# Patient Record
Sex: FEMALE | Race: WHITE | NOT HISPANIC OR LATINO | Employment: OTHER | URBAN - METROPOLITAN AREA
[De-identification: names, ages, dates, MRNs, and addresses within clinical notes are randomized per-mention and may not be internally consistent; named-entity substitution may affect disease eponyms.]

---

## 2017-06-23 ENCOUNTER — PREP FOR SURGERY (OUTPATIENT)
Dept: GYNECOLOGIC ONCOLOGY | Facility: CLINIC | Age: 82
End: 2017-06-23

## 2017-06-23 ENCOUNTER — OFFICE VISIT (OUTPATIENT)
Dept: GYNECOLOGIC ONCOLOGY | Facility: CLINIC | Age: 82
End: 2017-06-23

## 2017-06-23 VITALS
RESPIRATION RATE: 16 BRPM | HEIGHT: 63 IN | SYSTOLIC BLOOD PRESSURE: 167 MMHG | BODY MASS INDEX: 38.62 KG/M2 | TEMPERATURE: 97 F | HEART RATE: 63 BPM | OXYGEN SATURATION: 96 % | DIASTOLIC BLOOD PRESSURE: 77 MMHG | WEIGHT: 218 LBS

## 2017-06-23 DIAGNOSIS — R93.89 THICKENED ENDOMETRIUM: ICD-10-CM

## 2017-06-23 DIAGNOSIS — Z98.890: ICD-10-CM

## 2017-06-23 DIAGNOSIS — N95.0 POSTMENOPAUSAL BLEEDING: ICD-10-CM

## 2017-06-23 DIAGNOSIS — C54.1 ENDOMETRIAL CANCER (HCC): Primary | ICD-10-CM

## 2017-06-23 PROCEDURE — 99205 OFFICE O/P NEW HI 60 MIN: CPT | Performed by: OBSTETRICS & GYNECOLOGY

## 2017-06-23 RX ORDER — ATENOLOL 25 MG/1
50 TABLET ORAL DAILY
COMMUNITY
End: 2017-11-29 | Stop reason: ALTCHOICE

## 2017-06-23 RX ORDER — GABAPENTIN 100 MG/1
600 CAPSULE ORAL ONCE
Status: CANCELLED | OUTPATIENT
Start: 2017-06-23 | End: 2017-06-23

## 2017-06-23 RX ORDER — DICYCLOMINE HYDROCHLORIDE 10 MG/1
10 CAPSULE ORAL 3 TIMES DAILY PRN
COMMUNITY

## 2017-06-23 RX ORDER — LEVOTHYROXINE SODIUM 0.1 MG/1
100 TABLET ORAL DAILY
COMMUNITY

## 2017-06-23 RX ORDER — HYDROCHLOROTHIAZIDE 12.5 MG/1
12.5 TABLET ORAL DAILY
COMMUNITY

## 2017-06-23 RX ORDER — ACETAMINOPHEN 325 MG/1
975 TABLET ORAL ONCE
Status: CANCELLED | OUTPATIENT
Start: 2017-06-23 | End: 2017-06-23

## 2017-06-23 RX ORDER — RANITIDINE 150 MG/1
150 TABLET ORAL 2 TIMES DAILY
COMMUNITY

## 2017-06-23 RX ORDER — SODIUM CHLORIDE 0.9 % (FLUSH) 0.9 %
1-10 SYRINGE (ML) INJECTION AS NEEDED
Status: CANCELLED | OUTPATIENT
Start: 2017-06-23

## 2017-06-23 RX ORDER — PRAVASTATIN SODIUM 40 MG
40 TABLET ORAL DAILY
COMMUNITY

## 2017-06-23 RX ORDER — ALPRAZOLAM 0.5 MG/1
0.5 TABLET ORAL 4 TIMES DAILY PRN
COMMUNITY

## 2017-06-23 RX ORDER — BENAZEPRIL HYDROCHLORIDE 20 MG/1
20 TABLET ORAL DAILY
COMMUNITY

## 2017-06-23 RX ORDER — CITALOPRAM 20 MG/1
20 TABLET ORAL DAILY
COMMUNITY

## 2017-06-23 NOTE — PATIENT INSTRUCTIONS
Gynecologic Oncology  Inpatient Pre-op Patient Education  *See checked boxes for your instructions*    Patient Name:  Varsha Linares  [id number]  1935    Surgeon:  Dr. Ngo    Appointment  [x]  1. Your surgery has been scheduled on 6-30-17. You will need to be at the second floor surgery registration of the main hospital on that day at 6:00 AM.   [x] 2.  You have a pre-admission testing (PAT) appointment for labs and possibly chest xray and EKG, on 6-29-17 at 12:30 PM.  You will need to be at hospital registration on the first floor, 10 minutes before that time.   [x] 3.  The hospital registration department is located in the long hallway between the [address] buildings.     The Day(s) Before Surgery  [x] 1. On 6-29-17, the day prior to surgery, eat lightly.  No solid food after midnight on 6-29-17, including NO MILK, CREAM, OR ORANGE JUICE.  You may have sips of clear fluids up until two-three hours prior to your arrival to the hospital on the morning of surgery.     [] 2.  You may need to use a stool softener, the day prior to surgery to help with existing constipation and to clean out your bowels.  You can purchase Miralax over-the-counter at the pharmacy and follow the directions on the back.  (Do not do this step unless the box is checked).   [x] 3.  Do not take vitamins or full dose aspirin one week before surgery.  If you normally take a blood thinning medication such as Warfarin, Eliquis, or Xarelto, we will give you specific instructions regarding these medications and we may need to talk with your other doctors.   [x] 4.  On the morning of your surgery, you may likely take your routine prescription medications with a sip of water as reviewed with you by your surgeon.  Bring your home medications with you to the hospital as we may need to reference these.  In particular be sure to bring any inhalers.     Post-surgery Instructions  [x] 1.  The length of stay for your type of surgery is  typically one hospital night, however it is also possible that you could be discharged home in the evening on the same day depending on the nature of your surgery.  All rooms are private, so family member may stay with you.     [x] 2.  Do not take your own home prescription medication while you are in the hospital unless otherwise instructed.  These will be provided to you.         Comments:

## 2017-06-23 NOTE — PROGRESS NOTES
Varsha Linares  [id number]  1935    Subjective     Reason for visit:  Patient is being seen at the request of Dr. Manning for endometrial cancer    History of present illness:  The patient is a 81 y.o. female with a new diagnosis of endometrial cancer.  She presented to Dr. Manning with postmenopausal bleeding.  Endometrial biopsy showed endometrial adenocarcinoma with papillary features and foci of high-grade histology.  Comments state overall grade 2 with foci of grade 3.  Pap smear showed neoplastic cells, endocervical component.  She was referred to GYN Oncology    Today the patient endorses the above noted history.  She states that she experienced an episode of very heavy vaginal bleeding just after Memorial Day and this required evaluation at [facility] emergency department.  It sounds like there she had labs and an ultrasound.  She was then referred to Dr. Manning and had the above reported biopsy.  She has been without bleeding since.  When she did have bleeding she had abdominal cramping and pain in the left lower quadrant.  The symptoms are mild today.    Otherwise denies issues with bowel or bladder function.  Denies vomiting.  She's had mild nausea.  Also endorses fatigue.  Denies fevers or chills.  Denies chest pain or shortness of breath.  Normal appetite.    Treatment History:    1.  EMB with Dr. Manning showed showed endometrial adenocarcinoma with papillary features and foci of high-grade histology.  Comments stated overall grade 2 with foci of grade 3.      OBGYN History: NSVDx2.  No history of abnormal paps.  Prior RSO via Pfannenstiel for ovarian cysts.    Past Medical History:   Diagnosis Date   • Anxiety    • BMI 37.0-37.9, adult    • Dyslipidemia    • Endometrial cancer    • GERD (gastroesophageal reflux disease)    • HTN (hypertension)    • Hypothyroidism    • Spinal stenosis        Past Surgical History:   Procedure Laterality Date   • APPENDECTOMY OPEN     • BACK SURGERY     •  BREAST BIOPSY Right     x2, benign disease   • CATARACT EXTRACTION     • CHOLECYSTECTOMY OPEN  1987   • RIGHT OOPHORECTOMY  1969    Pfannenstiel.  Ovarian cysts.   • TOTAL KNEE ARTHROPLASTY Left 2007       MEDICATIONS: The current medication list was reviewed with the patient and updated in the EMR this date per the Medical Assistant. Medication dosages and frequencies were confirmed to be accurate.      Allergies:  has No Known Allergies.    Social History: She is .  She lives 45 min away in [place].  She lives by herself and does not work.  She denies use of tobacco, alcohol, and illicit drugs.     Family History:  Denies a history of breast, colon, endometrial, ovarian, and prostate cancer.  No history of melanoma.    Health Maintenance:    1. Mammogram - 12/2015  2. Colonoscopy - 2002    Review of Systems:  CONSTITUTIONAL:  Weight has been stable.  No excessive fatigue.  No fever.  No chills, night sweats.  PSYCHIATRIC: + anxiety.  No history of depression. No bipolar disorder or insomnia.  EYES: Vision is unchanged.  No photophobia.  EARS, NOSE, MOUTH, AND THROAT: Hearing normal. No swallowing difficulties.  No sore throat.  ENDOCRINE: No history of diabetes.  + thyroid disease.  LYMPHATIC: No enlarged lymph nodes  RESPIRATORY: No shortness of breath, cough, asthma or wheezing.  No hemoptysis.  CARDIOVASCULAR: No angina, orthopnea.  No edema.  +HTN.  +hyperlipidemia.  GASTROINTESTINAL: No constipation.  No diarrhea.  No melena.  +reflux.  No nausea.  No vomiting.  GENITOURINARY: No dysuria, hematuria, urgency, or frequency.  NEUROLOGIC: No numbness, weakness, motor disturbance, syncope, seizure, or headaches.  MUSCULOSKELETAL: + joint pain.  No muscle weakness.  INTEGUMENTARY: No new skin lesions.  GYNECOLOGIC: Per history of present illness.  HEMATOLOGIC: No bleeding problems.  Denies easy bruising.    Objective      Physical Exam  Vitals:    06/23/17 1004   BP: 167/77   Pulse: 63   Resp: 16   Temp:  97 °F (36.1 °C)   SpO2: 96%     Body mass index is 38.62 kg/(m^2).    Wt Readings from Last 3 Encounters:   06/23/17 218 lb (98.9 kg)     ECOG PS 0    GENERAL: Alert, well-appearing female in no apparent distress.  She appears her stated age.  She is here with her son.  HEENT: Sclera anicteric, normal eyelids. Head normocephalic, atraumatic. Mucus membranes moist.  Normal dentition.    NECK: Trachea midline, supple, without masses.  No thyromegaly.      BREASTS: Deferred  CARDIOVASCULAR: Normal rate, regular rhythm, no murmurs, rubs, or gallops.  Extremities symmetric.  No peripheral edema.  RESPIRATORY: Clear to auscultation bilaterally, normal respiratory effort  GASTROINTESTINAL:  Soft, obese, non-tender, non-distended, no rebound or guarding, no masses, or hernias.  No HSM.  Incisions - well-healed Pfannenstiel, open appendectomy and open aida incisions  MUSCULOSKELETAL:  Normal gait and station.  FROMx4.  No digital cyanosis.    SKIN:  Warm, dry, well-perfused.  All visible areas intact.  No rashes, lesions, ulcers.  PSYCHIATRIC: AO x3, with appropriate affect, normal thought processes  LYMPHATICS:  No cervical or inguinal adenopathy noted.  PELVIC exam:  Normal external female genitalia without lesions or skin changes.  Urethra midline.  Vagina without lesions.  Cervix 2 cm and without visible lesions.  On bimanual exam vagina and cervix are smooth and without nodularity.  No CMT.  Uterus is indistinct secondary to habitus. No palpable abdominal masses. Rectovaginal exam confirmatory. The pelvic sidewalls are smooth, the cul de sac is clear, the rectovaginal septum is supple.      Diagnostic Data:    Pathology report per above      Assessment/Plan  This is a 81 y.o. woman clinical stage IA endometrial adenocarcinoma with papillary features and foci of high-grade histology (overall grade 2 with foci of grade 3)    1.  Endometrial cancer  · I reviewed her pathology report and the natural history of her type of  endometrial cancer and [place] pictures to supplement the discussion.  We discussed the different types of histology and the difference between stage and grade.  · I discussed the general approach to the treatment of endometrial cancer as surgical staging   · With high-grade disease in the reported extent of bleeding she had at presentation, I recommend a CT scan of the abdomen and pelvis for treatment planning and ordered this today.  · I also requested her outside hospital emergency Department records  · Otherwise, I will plan on proceeding with a robotic approach  · I discussed various strategies for lymph node dissection (full staging versus staging based on frozen section) and recommend frozen section at the time of surgery to guide decision-making.   · I reviewed the surgical risks as including but not limited to as bleeding, infection, damage to surrounding structures, wound complications, VTE, lymphedema, and sensory nerve disruption.  As well as possible need to convert to laparotomy.  She understands her obesity and extensive prior surgical history place her at additional risk. .  · All questions were answered.      2.  Surgery:    · I have scheduled her surgery for 6/30/17  · Plan is for a RATLH/BSO/possible staging  · An appointment has been made for her to go to preoperative testing.     · In terms of additional work-up needed prior to surgery, I have ordered her pre-operative labs including a (CBC, CMP, , T+S).  I have also ordered an EKG, and CXR.  · Per usual recommendations, she will receive double DVT prophylaxis with a pharmacologic agent/SCD's.  I have ordered Enoxaparin to be given in the pre-op area.  · I also recommend she receive a TAP block the morning of her surgery and have ordered Gabapentin, and Acetaminophen to be given in the pre-op area in accordance with the Enhanced Recovery After Surgery protocols.   · I ordered perioperative antibiotics as Ancef  · Bowel preparation is not  indicated.          Electronically Signed by: Pretty Ngo MD  Date: 6/23/2017      Time:  12:33 PM    Note: Speech recognition transcription software was used to dictate portions of this document.  An attempt at proofreading has been made though minor errors in transcription may still be present.  Please do not hesitate to call our office with any questions.      Addendum  OSH records received.  U/s on 5/30/17 showed a 7 x 5 x 7 cm uterus with a thickened and heterogeneous stripe measuring 2 cm.  Additionally a cystic 1 cm area was seen in the endometrium near the fundus.  No free fluid present.    11.8>13.9/41.7<192.  Creatinine 1.1    Pretty Ngo MD  06/23/17  12:35 PM

## 2017-06-23 NOTE — H&P
Varsha Linares  [id number]  1935    Subjective     Reason for visit:  Patient is being seen at the request of Dr. Manning for endometrial cancer    History of present illness:  The patient is a 81 y.o. female with a new diagnosis of endometrial cancer.  She presented to Dr. Manning with postmenopausal bleeding.  Endometrial biopsy showed endometrial adenocarcinoma with papillary features and foci of high-grade histology.  Comments state overall grade 2 with foci of grade 3.  Pap smear showed neoplastic cells, endocervical component.  She was referred to GYN Oncology    Today the patient endorses the above noted history.  She states that she experienced an episode of very heavy vaginal bleeding just after Memorial Day and this required evaluation at [facility] emergency department.  It sounds like there she had labs and an ultrasound.  She was then referred to Dr. Manning and had the above reported biopsy.  She has been without bleeding since.  When she did have bleeding she had abdominal cramping and pain in the left lower quadrant.  The symptoms are mild today.    Otherwise denies issues with bowel or bladder function.  Denies vomiting.  She's had mild nausea.  Also endorses fatigue.  Denies fevers or chills.  Denies chest pain or shortness of breath.  Normal appetite.    Treatment History:    1.  EMB with Dr. Manning showed showed endometrial adenocarcinoma with papillary features and foci of high-grade histology.  Comments stated overall grade 2 with foci of grade 3.      OBGYN History: NSVDx2.  No history of abnormal paps.  Prior RSO via Pfannenstiel for ovarian cysts.    Past Medical History:   Diagnosis Date   • Anxiety    • BMI 37.0-37.9, adult    • Dyslipidemia    • Endometrial cancer    • GERD (gastroesophageal reflux disease)    • HTN (hypertension)    • Hypothyroidism    • Spinal stenosis        Past Surgical History:   Procedure Laterality Date   • APPENDECTOMY OPEN     • BACK SURGERY     •  BREAST BIOPSY Right     x2, benign disease   • CATARACT EXTRACTION     • CHOLECYSTECTOMY OPEN  1987   • RIGHT OOPHORECTOMY  1969    Pfannenstiel.  Ovarian cysts.   • TOTAL KNEE ARTHROPLASTY Left 2007       MEDICATIONS: The current medication list was reviewed with the patient and updated in the EMR this date per the Medical Assistant. Medication dosages and frequencies were confirmed to be accurate.      Allergies:  has No Known Allergies.    Social History: She is .  She lives 45 min away in [place].  She lives by herself and does not work.  She denies use of tobacco, alcohol, and illicit drugs.     Family History:  Denies a history of breast, colon, endometrial, ovarian, and prostate cancer.  No history of melanoma.    Health Maintenance:    1. Mammogram - 12/2015  2. Colonoscopy - 2002    Review of Systems:  CONSTITUTIONAL:  Weight has been stable.  No excessive fatigue.  No fever.  No chills, night sweats.  PSYCHIATRIC: + anxiety.  No history of depression. No bipolar disorder or insomnia.  EYES: Vision is unchanged.  No photophobia.  EARS, NOSE, MOUTH, AND THROAT: Hearing normal. No swallowing difficulties.  No sore throat.  ENDOCRINE: No history of diabetes.  + thyroid disease.  LYMPHATIC: No enlarged lymph nodes  RESPIRATORY: No shortness of breath, cough, asthma or wheezing.  No hemoptysis.  CARDIOVASCULAR: No angina, orthopnea.  No edema.  +HTN.  +hyperlipidemia.  GASTROINTESTINAL: No constipation.  No diarrhea.  No melena.  +reflux.  No nausea.  No vomiting.  GENITOURINARY: No dysuria, hematuria, urgency, or frequency.  NEUROLOGIC: No numbness, weakness, motor disturbance, syncope, seizure, or headaches.  MUSCULOSKELETAL: + joint pain.  No muscle weakness.  INTEGUMENTARY: No new skin lesions.  GYNECOLOGIC: Per history of present illness.  HEMATOLOGIC: No bleeding problems.  Denies easy bruising.    Objective      Physical Exam  Vitals:    06/23/17 1004   BP: 167/77   Pulse: 63   Resp: 16   Temp:  97 °F (36.1 °C)   SpO2: 96%     Body mass index is 38.62 kg/(m^2).    Wt Readings from Last 3 Encounters:   06/23/17 218 lb (98.9 kg)     ECOG PS 0    GENERAL: Alert, well-appearing female in no apparent distress.  She appears her stated age.  She is here with her son.  HEENT: Sclera anicteric, normal eyelids. Head normocephalic, atraumatic. Mucus membranes moist.  Normal dentition.    NECK: Trachea midline, supple, without masses.  No thyromegaly.      BREASTS: Deferred  CARDIOVASCULAR: Normal rate, regular rhythm, no murmurs, rubs, or gallops.  Extremities symmetric.  No peripheral edema.  RESPIRATORY: Clear to auscultation bilaterally, normal respiratory effort  GASTROINTESTINAL:  Soft, obese, non-tender, non-distended, no rebound or guarding, no masses, or hernias.  No HSM.  Incisions - well-healed Pfannenstiel, open appendectomy and open aida incisions  MUSCULOSKELETAL:  Normal gait and station.  FROMx4.  No digital cyanosis.    SKIN:  Warm, dry, well-perfused.  All visible areas intact.  No rashes, lesions, ulcers.  PSYCHIATRIC: AO x3, with appropriate affect, normal thought processes  LYMPHATICS:  No cervical or inguinal adenopathy noted.  PELVIC exam:  Normal external female genitalia without lesions or skin changes.  Urethra midline.  Vagina without lesions.  Cervix 2 cm and without visible lesions.  On bimanual exam vagina and cervix are smooth and without nodularity.  No CMT.  Uterus is indistinct secondary to habitus. No palpable abdominal masses. Rectovaginal exam confirmatory. The pelvic sidewalls are smooth, the cul de sac is clear, the rectovaginal septum is supple.      Diagnostic Data:    Pathology report per above      Assessment/Plan  This is a 81 y.o. woman clinical stage IA endometrial adenocarcinoma with papillary features and foci of high-grade histology (overall grade 2 with foci of grade 3)    1.  Endometrial cancer  · I reviewed her pathology report and the natural history of her type of  endometrial cancer and [place] pictures to supplement the discussion.  We discussed the different types of histology and the difference between stage and grade.  · I discussed the general approach to the treatment of endometrial cancer as surgical staging   · With high-grade disease in the reported extent of bleeding she had at presentation, I recommend a CT scan of the abdomen and pelvis for treatment planning and ordered this today.  · I also requested her outside hospital emergency Department records  · Otherwise, I will plan on proceeding with a robotic approach  · I discussed various strategies for lymph node dissection (full staging versus staging based on frozen section) and recommend frozen section at the time of surgery to guide decision-making.   · I reviewed the surgical risks as including but not limited to as bleeding, infection, damage to surrounding structures, wound complications, VTE, lymphedema, and sensory nerve disruption.  As well as possible need to convert to laparotomy.  She understands her obesity and extensive prior surgical history place her at additional risk. .  · All questions were answered.      2.  Surgery:    · I have scheduled her surgery for 6/30/17  · Plan is for a RATLH/BSO/possible staging  · An appointment has been made for her to go to preoperative testing.     · In terms of additional work-up needed prior to surgery, I have ordered her pre-operative labs including a (CBC, CMP, , T+S).  I have also ordered an EKG, and CXR.  · Per usual recommendations, she will receive double DVT prophylaxis with a pharmacologic agent/SCD's.  I have ordered Enoxaparin to be given in the pre-op area.  · I also recommend she receive a TAP block the morning of her surgery and have ordered Gabapentin, and Acetaminophen to be given in the pre-op area in accordance with the Enhanced Recovery After Surgery protocols.   · I ordered perioperative antibiotics as Ancef  · Bowel preparation is not  indicated.          Electronically Signed by: Pretty Ngo MD  Date: 6/23/2017      Time:  12:33 PM    Note: Speech recognition transcription software was used to dictate portions of this document.  An attempt at proofreading has been made though minor errors in transcription may still be present.  Please do not hesitate to call our office with any questions.      Addendum  OSH records received.  U/s on 5/30/17 showed a 7 x 5 x 7 cm uterus with a thickened and heterogeneous stripe measuring 2 cm.  Additionally a cystic 1 cm area was seen in the endometrium near the fundus.  No free fluid present.    11.8>13.9/41.7<192.  Creatinine 1.1    Pretty Ngo MD  06/23/17  12:35 PM

## 2017-06-28 ENCOUNTER — RESULTS ENCOUNTER (OUTPATIENT)
Dept: GYNECOLOGIC ONCOLOGY | Facility: CLINIC | Age: 82
End: 2017-06-28

## 2017-06-28 DIAGNOSIS — C54.1 ENDOMETRIAL CANCER (HCC): ICD-10-CM

## 2017-06-29 ENCOUNTER — APPOINTMENT (OUTPATIENT)
Dept: PREADMISSION TESTING | Facility: HOSPITAL | Age: 82
End: 2017-06-29

## 2017-06-29 ENCOUNTER — HOSPITAL ENCOUNTER (OUTPATIENT)
Dept: GENERAL RADIOLOGY | Facility: HOSPITAL | Age: 82
Discharge: HOME | End: 2017-06-29

## 2017-06-29 ENCOUNTER — HOSPITAL ENCOUNTER (OUTPATIENT)
Dept: CT IMAGING | Facility: HOSPITAL | Age: 82
Discharge: HOME | End: 2017-06-29
Attending: OBSTETRICS & GYNECOLOGY | Admitting: OBSTETRICS & GYNECOLOGY

## 2017-06-29 VITALS — WEIGHT: 218.92 LBS | BODY MASS INDEX: 37.37 KG/M2 | HEIGHT: 64 IN

## 2017-06-29 DIAGNOSIS — C54.1 ENDOMETRIAL CANCER (HCC): ICD-10-CM

## 2017-06-29 LAB
ABO GROUP BLD: NORMAL
ALBUMIN SERPL-MCNC: 4.2 G/DL (ref 3.2–4.8)
ALBUMIN/GLOB SERPL: 2 G/DL (ref 1.5–2.5)
ALP SERPL-CCNC: 68 U/L (ref 25–100)
ALT SERPL W P-5'-P-CCNC: 20 U/L (ref 7–40)
ANION GAP SERPL CALCULATED.3IONS-SCNC: 7 MMOL/L (ref 3–11)
AST SERPL-CCNC: 24 U/L (ref 0–33)
BASOPHILS # BLD AUTO: 0.04 10*3/MM3 (ref 0–0.2)
BASOPHILS NFR BLD AUTO: 0.4 % (ref 0–1)
BILIRUB SERPL-MCNC: 0.6 MG/DL (ref 0.3–1.2)
BLD GP AB SCN SERPL QL: NEGATIVE
BUN BLD-MCNC: 16 MG/DL (ref 9–23)
BUN/CREAT SERPL: 13.3 (ref 7–25)
CALCIUM SPEC-SCNC: 9.8 MG/DL (ref 8.7–10.4)
CANCER AG125 SERPL QL: 29 U/ML (ref 0–30.2)
CHLORIDE SERPL-SCNC: 105 MMOL/L (ref 99–109)
CO2 SERPL-SCNC: 27 MMOL/L (ref 20–31)
CREAT BLD-MCNC: 1.2 MG/DL (ref 0.6–1.3)
DEPRECATED RDW RBC AUTO: 45.3 FL (ref 37–54)
EOSINOPHIL # BLD AUTO: 0.07 10*3/MM3 (ref 0–0.3)
EOSINOPHIL NFR BLD AUTO: 0.7 % (ref 0–3)
ERYTHROCYTE [DISTWIDTH] IN BLOOD BY AUTOMATED COUNT: 13 % (ref 11.3–14.5)
GFR SERPL CREATININE-BSD FRML MDRD: 43 ML/MIN/1.73
GLOBULIN UR ELPH-MCNC: 2.1 GM/DL
GLUCOSE BLD-MCNC: 95 MG/DL (ref 70–100)
HCT VFR BLD AUTO: 42.9 % (ref 34.5–44)
HGB BLD-MCNC: 13.9 G/DL (ref 11.5–15.5)
IMM GRANULOCYTES # BLD: 0.03 10*3/MM3 (ref 0–0.03)
IMM GRANULOCYTES NFR BLD: 0.3 % (ref 0–0.6)
LYMPHOCYTES # BLD AUTO: 4.93 10*3/MM3 (ref 0.6–4.8)
LYMPHOCYTES NFR BLD AUTO: 51.4 % (ref 24–44)
MCH RBC QN AUTO: 30.9 PG (ref 27–31)
MCHC RBC AUTO-ENTMCNC: 32.4 G/DL (ref 32–36)
MCV RBC AUTO: 95.3 FL (ref 80–99)
MONOCYTES # BLD AUTO: 0.73 10*3/MM3 (ref 0–1)
MONOCYTES NFR BLD AUTO: 7.6 % (ref 0–12)
NEUTROPHILS # BLD AUTO: 3.79 10*3/MM3 (ref 1.5–8.3)
NEUTROPHILS NFR BLD AUTO: 39.6 % (ref 41–71)
PLATELET # BLD AUTO: 188 10*3/MM3 (ref 150–450)
PMV BLD AUTO: 9.3 FL (ref 6–12)
POTASSIUM BLD-SCNC: 4 MMOL/L (ref 3.5–5.5)
PROT SERPL-MCNC: 6.3 G/DL (ref 5.7–8.2)
RBC # BLD AUTO: 4.5 10*6/MM3 (ref 3.89–5.14)
RH BLD: POSITIVE
SODIUM BLD-SCNC: 139 MMOL/L (ref 132–146)
WBC NRBC COR # BLD: 9.59 10*3/MM3 (ref 3.5–10.8)

## 2017-06-29 RX ADMIN — IOPAMIDOL 90 ML: 612 INJECTION, SOLUTION INTRAVENOUS at 14:56

## 2017-06-30 ENCOUNTER — ANESTHESIA EVENT (OUTPATIENT)
Dept: PERIOP | Facility: HOSPITAL | Age: 82
End: 2017-06-30

## 2017-06-30 ENCOUNTER — ANESTHESIA (OUTPATIENT)
Dept: PERIOP | Facility: HOSPITAL | Age: 82
End: 2017-06-30

## 2017-06-30 ENCOUNTER — HOSPITAL ENCOUNTER (INPATIENT)
Facility: HOSPITAL | Age: 82
LOS: 1 days | Discharge: HOME | End: 2017-07-01
Attending: OBSTETRICS & GYNECOLOGY | Admitting: OBSTETRICS & GYNECOLOGY

## 2017-06-30 DIAGNOSIS — C54.1 ENDOMETRIAL CANCER (HCC): ICD-10-CM

## 2017-06-30 LAB
ABO GROUP BLD: NORMAL
RH BLD: POSITIVE

## 2017-06-30 PROCEDURE — 94799 UNLISTED PULMONARY SVC/PX: CPT

## 2017-06-30 PROCEDURE — 88331 PATH CONSLTJ SURG 1 BLK 1SPC: CPT | Performed by: OBSTETRICS & GYNECOLOGY

## 2017-06-30 PROCEDURE — 8E0W4CZ ROBOTIC ASSISTED PROCEDURE OF TRUNK REGION, PERCUTANEOUS ENDOSCOPIC APPROACH: ICD-10-PCS | Performed by: OBSTETRICS & GYNECOLOGY

## 2017-06-30 PROCEDURE — 0UT9FZZ RESECTION OF UTERUS, VIA NATURAL OR ARTIFICIAL OPENING WITH PERCUTANEOUS ENDOSCOPIC ASSISTANCE: ICD-10-PCS | Performed by: OBSTETRICS & GYNECOLOGY

## 2017-06-30 PROCEDURE — 25010000002 NEOSTIGMINE PER 0.5 MG: Performed by: NURSE ANESTHETIST, CERTIFIED REGISTERED

## 2017-06-30 PROCEDURE — 88305 TISSUE EXAM BY PATHOLOGIST: CPT | Performed by: OBSTETRICS & GYNECOLOGY

## 2017-06-30 PROCEDURE — 25010000002 FENTANYL CITRATE (PF) 100 MCG/2ML SOLUTION: Performed by: NURSE ANESTHETIST, CERTIFIED REGISTERED

## 2017-06-30 PROCEDURE — 25010000002 PROPOFOL 1000 MG/ML EMULSION: Performed by: NURSE ANESTHETIST, CERTIFIED REGISTERED

## 2017-06-30 PROCEDURE — 25010000002 HYDROMORPHONE PER 4 MG: Performed by: OBSTETRICS & GYNECOLOGY

## 2017-06-30 PROCEDURE — 58571 TLH W/T/O 250 G OR LESS: CPT | Performed by: OBSTETRICS & GYNECOLOGY

## 2017-06-30 PROCEDURE — 25010000002 BUPRENORPHINE PER 0.1 MG: Performed by: NURSE ANESTHETIST, CERTIFIED REGISTERED

## 2017-06-30 PROCEDURE — 25010000002 ONDANSETRON PER 1 MG: Performed by: NURSE ANESTHETIST, CERTIFIED REGISTERED

## 2017-06-30 PROCEDURE — 88309 TISSUE EXAM BY PATHOLOGIST: CPT | Performed by: OBSTETRICS & GYNECOLOGY

## 2017-06-30 PROCEDURE — 25010000002 ENOXAPARIN PER 10 MG: Performed by: OBSTETRICS & GYNECOLOGY

## 2017-06-30 PROCEDURE — 25010000002 PROPOFOL 10 MG/ML EMULSION: Performed by: NURSE ANESTHETIST, CERTIFIED REGISTERED

## 2017-06-30 PROCEDURE — 25010000003 CEFAZOLIN IN DEXTROSE 2-4 GM/100ML-% SOLUTION: Performed by: OBSTETRICS & GYNECOLOGY

## 2017-06-30 PROCEDURE — 86900 BLOOD TYPING SEROLOGIC ABO: CPT

## 2017-06-30 PROCEDURE — 0UT7FZZ RESECTION OF BILATERAL FALLOPIAN TUBES, VIA NATURAL OR ARTIFICIAL OPENING WITH PERCUTANEOUS ENDOSCOPIC ASSISTANCE: ICD-10-PCS | Performed by: OBSTETRICS & GYNECOLOGY

## 2017-06-30 PROCEDURE — 25010000002 DEXAMETHASONE PER 1 MG: Performed by: NURSE ANESTHETIST, CERTIFIED REGISTERED

## 2017-06-30 PROCEDURE — 88307 TISSUE EXAM BY PATHOLOGIST: CPT | Performed by: OBSTETRICS & GYNECOLOGY

## 2017-06-30 PROCEDURE — 86901 BLOOD TYPING SEROLOGIC RH(D): CPT

## 2017-06-30 PROCEDURE — 25010000002 ONDANSETRON PER 1 MG: Performed by: OBSTETRICS & GYNECOLOGY

## 2017-06-30 PROCEDURE — 0DNS4ZZ RELEASE GREATER OMENTUM, PERCUTANEOUS ENDOSCOPIC APPROACH: ICD-10-PCS | Performed by: OBSTETRICS & GYNECOLOGY

## 2017-06-30 PROCEDURE — 25010000002 PROMETHAZINE PER 50 MG: Performed by: OBSTETRICS & GYNECOLOGY

## 2017-06-30 PROCEDURE — 38571 LAPAROSCOPY LYMPHADENECTOMY: CPT | Performed by: OBSTETRICS & GYNECOLOGY

## 2017-06-30 PROCEDURE — 25010000002 DEXAMETHASONE SODIUM PHOSPHATE 10 MG/ML SOLUTION 1 ML VIAL: Performed by: NURSE ANESTHETIST, CERTIFIED REGISTERED

## 2017-06-30 PROCEDURE — 0UT1FZZ RESECTION OF LEFT OVARY, VIA NATURAL OR ARTIFICIAL OPENING WITH PERCUTANEOUS ENDOSCOPIC ASSISTANCE: ICD-10-PCS | Performed by: OBSTETRICS & GYNECOLOGY

## 2017-06-30 PROCEDURE — 07BC4ZZ EXCISION OF PELVIS LYMPHATIC, PERCUTANEOUS ENDOSCOPIC APPROACH: ICD-10-PCS | Performed by: OBSTETRICS & GYNECOLOGY

## 2017-06-30 RX ORDER — DEXAMETHASONE SODIUM PHOSPHATE 4 MG/ML
INJECTION, SOLUTION INTRA-ARTICULAR; INTRALESIONAL; INTRAMUSCULAR; INTRAVENOUS; SOFT TISSUE AS NEEDED
Status: DISCONTINUED | OUTPATIENT
Start: 2017-06-30 | End: 2017-06-30 | Stop reason: SURG

## 2017-06-30 RX ORDER — HYDRALAZINE HYDROCHLORIDE 20 MG/ML
5 INJECTION INTRAMUSCULAR; INTRAVENOUS
Status: DISCONTINUED | OUTPATIENT
Start: 2017-06-30 | End: 2017-06-30 | Stop reason: HOSPADM

## 2017-06-30 RX ORDER — ONDANSETRON 4 MG/1
4 TABLET, FILM COATED ORAL EVERY 6 HOURS PRN
Status: DISCONTINUED | OUTPATIENT
Start: 2017-06-30 | End: 2017-07-01 | Stop reason: HOSPADM

## 2017-06-30 RX ORDER — CALCIUM CARBONATE 200(500)MG
2 TABLET,CHEWABLE ORAL 3 TIMES DAILY PRN
Status: DISCONTINUED | OUTPATIENT
Start: 2017-06-30 | End: 2017-07-01 | Stop reason: HOSPADM

## 2017-06-30 RX ORDER — CITALOPRAM 20 MG/1
20 TABLET ORAL DAILY
Status: DISCONTINUED | OUTPATIENT
Start: 2017-06-30 | End: 2017-07-01 | Stop reason: HOSPADM

## 2017-06-30 RX ORDER — BENAZEPRIL HYDROCHLORIDE 5 MG/1
20 TABLET, FILM COATED ORAL DAILY
Status: DISCONTINUED | OUTPATIENT
Start: 2017-07-01 | End: 2017-07-01 | Stop reason: HOSPADM

## 2017-06-30 RX ORDER — SODIUM CHLORIDE 9 MG/ML
100 INJECTION, SOLUTION INTRAVENOUS CONTINUOUS
Status: DISCONTINUED | OUTPATIENT
Start: 2017-06-30 | End: 2017-07-01 | Stop reason: HOSPADM

## 2017-06-30 RX ORDER — PROMETHAZINE HYDROCHLORIDE 25 MG/1
25 TABLET ORAL ONCE AS NEEDED
Status: DISCONTINUED | OUTPATIENT
Start: 2017-06-30 | End: 2017-06-30 | Stop reason: HOSPADM

## 2017-06-30 RX ORDER — POLYETHYLENE GLYCOL 3350 17 G/17G
17 POWDER, FOR SOLUTION ORAL DAILY
Status: DISCONTINUED | OUTPATIENT
Start: 2017-06-30 | End: 2017-07-01 | Stop reason: HOSPADM

## 2017-06-30 RX ORDER — ONDANSETRON 2 MG/ML
INJECTION INTRAMUSCULAR; INTRAVENOUS AS NEEDED
Status: DISCONTINUED | OUTPATIENT
Start: 2017-06-30 | End: 2017-06-30 | Stop reason: SURG

## 2017-06-30 RX ORDER — IBUPROFEN 600 MG/1
600 TABLET ORAL EVERY 6 HOURS PRN
Status: DISCONTINUED | OUTPATIENT
Start: 2017-06-30 | End: 2017-07-01 | Stop reason: HOSPADM

## 2017-06-30 RX ORDER — FENTANYL CITRATE 50 UG/ML
50 INJECTION, SOLUTION INTRAMUSCULAR; INTRAVENOUS
Status: DISCONTINUED | OUTPATIENT
Start: 2017-06-30 | End: 2017-06-30 | Stop reason: HOSPADM

## 2017-06-30 RX ORDER — LIDOCAINE HYDROCHLORIDE 10 MG/ML
0.5 INJECTION, SOLUTION EPIDURAL; INFILTRATION; INTRACAUDAL; PERINEURAL ONCE AS NEEDED
Status: COMPLETED | OUTPATIENT
Start: 2017-06-30 | End: 2017-06-30

## 2017-06-30 RX ORDER — OXYCODONE HYDROCHLORIDE 5 MG/1
5 TABLET ORAL EVERY 4 HOURS PRN
Status: DISCONTINUED | OUTPATIENT
Start: 2017-06-30 | End: 2017-07-01 | Stop reason: HOSPADM

## 2017-06-30 RX ORDER — GABAPENTIN 300 MG/1
600 CAPSULE ORAL ONCE
Status: COMPLETED | OUTPATIENT
Start: 2017-06-30 | End: 2017-06-30

## 2017-06-30 RX ORDER — LEVOTHYROXINE SODIUM 0.1 MG/1
100 TABLET ORAL
Status: DISCONTINUED | OUTPATIENT
Start: 2017-07-01 | End: 2017-07-01 | Stop reason: HOSPADM

## 2017-06-30 RX ORDER — OXYCODONE HYDROCHLORIDE 5 MG/1
10 TABLET ORAL EVERY 4 HOURS PRN
Status: DISCONTINUED | OUTPATIENT
Start: 2017-06-30 | End: 2017-07-01 | Stop reason: HOSPADM

## 2017-06-30 RX ORDER — PROMETHAZINE HYDROCHLORIDE 25 MG/ML
6.25 INJECTION, SOLUTION INTRAMUSCULAR; INTRAVENOUS EVERY 6 HOURS PRN
Status: DISCONTINUED | OUTPATIENT
Start: 2017-06-30 | End: 2017-07-01 | Stop reason: HOSPADM

## 2017-06-30 RX ORDER — PROMETHAZINE HYDROCHLORIDE 25 MG/ML
6.25 INJECTION, SOLUTION INTRAMUSCULAR; INTRAVENOUS ONCE AS NEEDED
Status: DISCONTINUED | OUTPATIENT
Start: 2017-06-30 | End: 2017-06-30 | Stop reason: HOSPADM

## 2017-06-30 RX ORDER — ACETAMINOPHEN 325 MG/1
975 TABLET ORAL ONCE
Status: COMPLETED | OUTPATIENT
Start: 2017-06-30 | End: 2017-06-30

## 2017-06-30 RX ORDER — FENTANYL CITRATE 50 UG/ML
INJECTION, SOLUTION INTRAMUSCULAR; INTRAVENOUS AS NEEDED
Status: DISCONTINUED | OUTPATIENT
Start: 2017-06-30 | End: 2017-06-30 | Stop reason: SURG

## 2017-06-30 RX ORDER — SODIUM CHLORIDE, SODIUM LACTATE, POTASSIUM CHLORIDE, CALCIUM CHLORIDE 600; 310; 30; 20 MG/100ML; MG/100ML; MG/100ML; MG/100ML
9 INJECTION, SOLUTION INTRAVENOUS CONTINUOUS
Status: DISCONTINUED | OUTPATIENT
Start: 2017-06-30 | End: 2017-06-30 | Stop reason: HOSPADM

## 2017-06-30 RX ORDER — LORAZEPAM 0.5 MG/1
0.5 TABLET ORAL EVERY 8 HOURS PRN
Status: DISCONTINUED | OUTPATIENT
Start: 2017-06-30 | End: 2017-07-01 | Stop reason: HOSPADM

## 2017-06-30 RX ORDER — ATORVASTATIN CALCIUM 10 MG/1
10 TABLET, FILM COATED ORAL NIGHTLY
Status: DISCONTINUED | OUTPATIENT
Start: 2017-06-30 | End: 2017-07-01 | Stop reason: HOSPADM

## 2017-06-30 RX ORDER — SODIUM CHLORIDE 0.9 % (FLUSH) 0.9 %
1-10 SYRINGE (ML) INJECTION AS NEEDED
Status: DISCONTINUED | OUTPATIENT
Start: 2017-06-30 | End: 2017-06-30 | Stop reason: HOSPADM

## 2017-06-30 RX ORDER — FAMOTIDINE 20 MG/1
20 TABLET, FILM COATED ORAL ONCE
Status: COMPLETED | OUTPATIENT
Start: 2017-06-30 | End: 2017-06-30

## 2017-06-30 RX ORDER — SODIUM CHLORIDE 9 MG/ML
INJECTION, SOLUTION INTRAVENOUS AS NEEDED
Status: DISCONTINUED | OUTPATIENT
Start: 2017-06-30 | End: 2017-06-30 | Stop reason: HOSPADM

## 2017-06-30 RX ORDER — ROCURONIUM BROMIDE 10 MG/ML
INJECTION, SOLUTION INTRAVENOUS AS NEEDED
Status: DISCONTINUED | OUTPATIENT
Start: 2017-06-30 | End: 2017-06-30 | Stop reason: SURG

## 2017-06-30 RX ORDER — PROPOFOL 10 MG/ML
VIAL (ML) INTRAVENOUS AS NEEDED
Status: DISCONTINUED | OUTPATIENT
Start: 2017-06-30 | End: 2017-06-30 | Stop reason: SURG

## 2017-06-30 RX ORDER — FAMOTIDINE 10 MG/ML
20 INJECTION, SOLUTION INTRAVENOUS ONCE
Status: CANCELLED | OUTPATIENT
Start: 2017-06-30 | End: 2017-06-30

## 2017-06-30 RX ORDER — ONDANSETRON 2 MG/ML
4 INJECTION INTRAMUSCULAR; INTRAVENOUS EVERY 6 HOURS PRN
Status: DISCONTINUED | OUTPATIENT
Start: 2017-06-30 | End: 2017-07-01 | Stop reason: HOSPADM

## 2017-06-30 RX ORDER — HYDROMORPHONE HYDROCHLORIDE 1 MG/ML
0.5 INJECTION, SOLUTION INTRAMUSCULAR; INTRAVENOUS; SUBCUTANEOUS
Status: DISCONTINUED | OUTPATIENT
Start: 2017-06-30 | End: 2017-06-30 | Stop reason: HOSPADM

## 2017-06-30 RX ORDER — HYDROCHLOROTHIAZIDE 12.5 MG/1
12.5 TABLET ORAL DAILY
Status: DISCONTINUED | OUTPATIENT
Start: 2017-07-01 | End: 2017-07-01 | Stop reason: HOSPADM

## 2017-06-30 RX ORDER — IPRATROPIUM BROMIDE AND ALBUTEROL SULFATE 2.5; .5 MG/3ML; MG/3ML
3 SOLUTION RESPIRATORY (INHALATION) ONCE AS NEEDED
Status: DISCONTINUED | OUTPATIENT
Start: 2017-06-30 | End: 2017-06-30 | Stop reason: HOSPADM

## 2017-06-30 RX ORDER — SIMETHICONE 80 MG
80 TABLET,CHEWABLE ORAL 4 TIMES DAILY PRN
Status: DISCONTINUED | OUTPATIENT
Start: 2017-06-30 | End: 2017-07-01 | Stop reason: HOSPADM

## 2017-06-30 RX ORDER — LIDOCAINE HYDROCHLORIDE 10 MG/ML
INJECTION, SOLUTION INFILTRATION; PERINEURAL AS NEEDED
Status: DISCONTINUED | OUTPATIENT
Start: 2017-06-30 | End: 2017-06-30 | Stop reason: SURG

## 2017-06-30 RX ORDER — CEFAZOLIN SODIUM 2 G/100ML
2 INJECTION, SOLUTION INTRAVENOUS ONCE
Status: COMPLETED | OUTPATIENT
Start: 2017-06-30 | End: 2017-06-30

## 2017-06-30 RX ORDER — ATENOLOL 50 MG/1
50 TABLET ORAL DAILY
Status: DISCONTINUED | OUTPATIENT
Start: 2017-07-01 | End: 2017-07-01 | Stop reason: HOSPADM

## 2017-06-30 RX ORDER — VECURONIUM BROMIDE 1 MG/ML
INJECTION, POWDER, LYOPHILIZED, FOR SOLUTION INTRAVENOUS AS NEEDED
Status: DISCONTINUED | OUTPATIENT
Start: 2017-06-30 | End: 2017-06-30 | Stop reason: SURG

## 2017-06-30 RX ORDER — FAMOTIDINE 20 MG/1
20 TABLET, FILM COATED ORAL EVERY 12 HOURS SCHEDULED
Status: DISCONTINUED | OUTPATIENT
Start: 2017-06-30 | End: 2017-07-01 | Stop reason: HOSPADM

## 2017-06-30 RX ORDER — ACETAMINOPHEN 325 MG/1
650 TABLET ORAL EVERY 6 HOURS SCHEDULED
Status: COMPLETED | OUTPATIENT
Start: 2017-06-30 | End: 2017-07-01

## 2017-06-30 RX ORDER — DICYCLOMINE HYDROCHLORIDE 10 MG/1
10 CAPSULE ORAL 3 TIMES DAILY PRN
Status: DISCONTINUED | OUTPATIENT
Start: 2017-06-30 | End: 2017-07-01 | Stop reason: HOSPADM

## 2017-06-30 RX ORDER — PROMETHAZINE HYDROCHLORIDE 25 MG/1
25 SUPPOSITORY RECTAL ONCE AS NEEDED
Status: DISCONTINUED | OUTPATIENT
Start: 2017-06-30 | End: 2017-06-30 | Stop reason: HOSPADM

## 2017-06-30 RX ORDER — PROMETHAZINE HYDROCHLORIDE 25 MG/ML
12.5 INJECTION, SOLUTION INTRAMUSCULAR; INTRAVENOUS EVERY 6 HOURS PRN
Status: DISCONTINUED | OUTPATIENT
Start: 2017-06-30 | End: 2017-06-30

## 2017-06-30 RX ORDER — MAGNESIUM HYDROXIDE 1200 MG/15ML
LIQUID ORAL AS NEEDED
Status: DISCONTINUED | OUTPATIENT
Start: 2017-06-30 | End: 2017-06-30 | Stop reason: HOSPADM

## 2017-06-30 RX ORDER — HYDROMORPHONE HYDROCHLORIDE 1 MG/ML
0.25 INJECTION, SOLUTION INTRAMUSCULAR; INTRAVENOUS; SUBCUTANEOUS EVERY 4 HOURS PRN
Status: DISCONTINUED | OUTPATIENT
Start: 2017-06-30 | End: 2017-07-01 | Stop reason: HOSPADM

## 2017-06-30 RX ORDER — GLYCOPYRROLATE 0.2 MG/ML
INJECTION INTRAMUSCULAR; INTRAVENOUS AS NEEDED
Status: DISCONTINUED | OUTPATIENT
Start: 2017-06-30 | End: 2017-06-30 | Stop reason: SURG

## 2017-06-30 RX ADMIN — PROPOFOL 100 MG: 10 INJECTION, EMULSION INTRAVENOUS at 07:57

## 2017-06-30 RX ADMIN — VECURONIUM BROMIDE 1 MG: 1 INJECTION, POWDER, LYOPHILIZED, FOR SOLUTION INTRAVENOUS at 10:17

## 2017-06-30 RX ADMIN — CEFAZOLIN SODIUM 2 G: 2 INJECTION, SOLUTION INTRAVENOUS at 10:57

## 2017-06-30 RX ADMIN — CEFAZOLIN SODIUM 2 G: 2 INJECTION, SOLUTION INTRAVENOUS at 07:57

## 2017-06-30 RX ADMIN — CITALOPRAM HYDROBROMIDE 20 MG: 20 TABLET ORAL at 17:45

## 2017-06-30 RX ADMIN — PROPOFOL 25 MCG/KG/MIN: 10 INJECTION, EMULSION INTRAVENOUS at 08:09

## 2017-06-30 RX ADMIN — SODIUM CHLORIDE 100 ML/HR: 9 INJECTION, SOLUTION INTRAVENOUS at 12:52

## 2017-06-30 RX ADMIN — DEXAMETHASONE SODIUM PHOSPHATE 60 ML: 10 INJECTION, SOLUTION INTRAMUSCULAR; INTRAVENOUS at 08:09

## 2017-06-30 RX ADMIN — EPHEDRINE SULFATE 10 MG: 50 INJECTION INTRAMUSCULAR; INTRAVENOUS; SUBCUTANEOUS at 10:14

## 2017-06-30 RX ADMIN — VECURONIUM BROMIDE 1 MG: 1 INJECTION, POWDER, LYOPHILIZED, FOR SOLUTION INTRAVENOUS at 09:02

## 2017-06-30 RX ADMIN — ACETAMINOPHEN 650 MG: 325 TABLET, FILM COATED ORAL at 23:07

## 2017-06-30 RX ADMIN — Medication 3 MG: at 11:13

## 2017-06-30 RX ADMIN — ONDANSETRON 4 MG: 2 INJECTION INTRAMUSCULAR; INTRAVENOUS at 13:04

## 2017-06-30 RX ADMIN — PROMETHAZINE HYDROCHLORIDE 6.25 MG: 25 INJECTION INTRAMUSCULAR; INTRAVENOUS at 17:44

## 2017-06-30 RX ADMIN — LIDOCAINE HYDROCHLORIDE 0.2 ML: 10 INJECTION, SOLUTION EPIDURAL; INFILTRATION; INTRACAUDAL; PERINEURAL at 07:02

## 2017-06-30 RX ADMIN — SODIUM CHLORIDE, POTASSIUM CHLORIDE, SODIUM LACTATE AND CALCIUM CHLORIDE: 600; 310; 30; 20 INJECTION, SOLUTION INTRAVENOUS at 10:10

## 2017-06-30 RX ADMIN — ACETAMINOPHEN 650 MG: 325 TABLET, FILM COATED ORAL at 13:04

## 2017-06-30 RX ADMIN — OXYCODONE HYDROCHLORIDE 5 MG: 5 TABLET ORAL at 13:03

## 2017-06-30 RX ADMIN — FAMOTIDINE 20 MG: 20 TABLET, FILM COATED ORAL at 07:20

## 2017-06-30 RX ADMIN — ROCURONIUM BROMIDE 50 MG: 10 INJECTION INTRAVENOUS at 07:57

## 2017-06-30 RX ADMIN — FAMOTIDINE 20 MG: 20 TABLET ORAL at 21:33

## 2017-06-30 RX ADMIN — ENOXAPARIN SODIUM 40 MG: 40 INJECTION SUBCUTANEOUS at 07:28

## 2017-06-30 RX ADMIN — LIDOCAINE HYDROCHLORIDE 50 MG: 10 INJECTION, SOLUTION INFILTRATION; PERINEURAL at 07:57

## 2017-06-30 RX ADMIN — SODIUM CHLORIDE, POTASSIUM CHLORIDE, SODIUM LACTATE AND CALCIUM CHLORIDE 9 ML/HR: 600; 310; 30; 20 INJECTION, SOLUTION INTRAVENOUS at 07:02

## 2017-06-30 RX ADMIN — HYDROMORPHONE HYDROCHLORIDE 0.25 MG: 1 INJECTION, SOLUTION INTRAMUSCULAR; INTRAVENOUS; SUBCUTANEOUS at 13:41

## 2017-06-30 RX ADMIN — ACETAMINOPHEN 975 MG: 325 TABLET ORAL at 07:20

## 2017-06-30 RX ADMIN — GABAPENTIN 600 MG: 300 CAPSULE ORAL at 07:20

## 2017-06-30 RX ADMIN — ROBINUL 0.4 MG: 0.2 INJECTION INTRAMUSCULAR; INTRAVENOUS at 11:13

## 2017-06-30 RX ADMIN — SODIUM CHLORIDE 100 ML/HR: 9 INJECTION, SOLUTION INTRAVENOUS at 22:12

## 2017-06-30 RX ADMIN — ATORVASTATIN CALCIUM 10 MG: 10 TABLET, FILM COATED ORAL at 21:33

## 2017-06-30 RX ADMIN — VECURONIUM BROMIDE 1 MG: 1 INJECTION, POWDER, LYOPHILIZED, FOR SOLUTION INTRAVENOUS at 09:27

## 2017-06-30 RX ADMIN — EPHEDRINE SULFATE 10 MG: 50 INJECTION INTRAMUSCULAR; INTRAVENOUS; SUBCUTANEOUS at 10:27

## 2017-06-30 RX ADMIN — ONDANSETRON 4 MG: 2 INJECTION INTRAMUSCULAR; INTRAVENOUS at 11:09

## 2017-06-30 RX ADMIN — ACETAMINOPHEN 650 MG: 325 TABLET, FILM COATED ORAL at 17:45

## 2017-06-30 RX ADMIN — FENTANYL CITRATE 50 MCG: 50 INJECTION, SOLUTION INTRAMUSCULAR; INTRAVENOUS at 07:57

## 2017-06-30 RX ADMIN — DEXAMETHASONE SODIUM PHOSPHATE 8 MG: 4 INJECTION, SOLUTION INTRAMUSCULAR; INTRAVENOUS at 08:09

## 2017-06-30 RX ADMIN — POLYETHYLENE GLYCOL 3350 17 G: 17 POWDER, FOR SOLUTION ORAL at 13:04

## 2017-06-30 NOTE — ANESTHESIA PROCEDURE NOTES
Airway  Urgency: elective    Airway not difficult    General Information and Staff    Patient location during procedure: OR  Anesthesiologist: KHARI AGGARWAL  CRNA: CLAUDE GARCÍA    Indications and Patient Condition  Indications for airway management: airway protection    Preoxygenated: yes  MILS not maintained throughout  Mask difficulty assessment: 1 - vent by mask    Final Airway Details  Final airway type: endotracheal airway      Successful airway: ETT  Cuffed: yes   Successful intubation technique: direct laryngoscopy  Facilitating devices/methods: intubating stylet  Endotracheal tube insertion site: oral  Blade: Prachi  Blade size: #3  ETT size: 7.0 mm  Cormack-Lehane Classification: grade I - full view of glottis  Placement verified by: chest auscultation and capnometry   Cuff volume (mL): 6  Measured from: lips  ETT to lips (cm): 20  Number of attempts at approach: 1    Additional Comments  Patient history, labs, physical exam, anesthetic plan reviewed with MDA and patient in preop.  Pt transported to room via RN. All ASA monitors applied, preoxygenated x 3 min/ ETO2 of >85, IV patent, smooth induction, easy intubation, + ETCO2, negative epigastric sounds, breath sound equal bilaterally with symmetric chest rise and fall, tube secured, all VSS, cspine neutrality maintained throughout induction, intubation and postitioning, all ppp, arms <90.

## 2017-06-30 NOTE — ANESTHESIA PROCEDURE NOTES
Peripheral Block    Patient location during procedure: pre-op  Reason for block: at surgeon's request and post-op pain management  Performed by  Anesthesiologist: ONELIA GOFF  Preanesthetic Checklist  Completed: patient identified, site marked, surgical consent, pre-op evaluation, timeout performed, IV checked, risks and benefits discussed and monitors and equipment checked  Peripheral Block Prep:  Sterile barriers:cap, gloves, sterile barriers and mask  Prep: ChloraPrep  Patient monitoring: blood pressure monitoring, continuous pulse oximetry and EKG  Peripheral Procedure  Guidance:ultrasound guided  Images:still images obtained    Laterality:Bilateral  Block Type:TAP  Injection Technique:single-shot  Needle Type:short-bevel  Needle Gauge:20 G    Medications  Comment:Block Injection:  LA dose divided between Right and Left block       Adjuncts:  Decadron 4mg PSF, Buprenex 0.3mg (Per total volume of LA)  Local Injected:bupivacaine 0.25% Local Amount Injected:60mL  Post Assessment  Injection Assessment: negative aspiration for heme, incremental injection and no paresthesia on injection  Patient Tolerance:comfortable throughout block  Complications:no  Additional Notes  The pt was placed in the Supine Position and was  anesthetized with:       General Anesthesia     Under Ultrasound guidance, a BBraun 4inch 360 degree needle was advanced with Normal Saline hydro dissection of tissue.  The Internal Oblique and Transversus Abdominus muscles where visualized.  At or before the aponeurosis of Internal Oblique, local anesthetic spread was visualized in the Transversus Abdominus Plane. Injection was made incrementally with aspiration every 5 mls.  There was no  intravascular injection,  injection pressure was normal, there was no neural injection, and the procedure was completed without difficulty.  Thank You.

## 2017-06-30 NOTE — ANESTHESIA PREPROCEDURE EVALUATION
Anesthesia Evaluation     NPO Solid Status: > 8 hours  NPO Liquid Status: > 8 hours     Airway   Mallampati: III  TM distance: >3 FB  Neck ROM: limited  possible difficult intubation  Dental    (+) poor dentition        Pulmonary    (-) asthma, not a smoker  Cardiovascular     ECG reviewed  Rhythm: regular  Rate: normal    (+) hypertension,   (-) angina, murmur, cardiac stents      Neuro/Psych  (-) seizures, TIA, CVA  GI/Hepatic/Renal/Endo    (+) obesity,    (-) liver disease, no renal disease, diabetes    Musculoskeletal     Abdominal    Substance History      OB/GYN          Other                                      Anesthesia Plan    ASA 3     general   (GA, glidescope in the OR)  intravenous induction     Plan discussed with CRNA.

## 2017-06-30 NOTE — ANESTHESIA POSTPROCEDURE EVALUATION
Patient: Varsha Linares    Procedure Summary     Date Anesthesia Start Anesthesia Stop Room / Location    06/30/17 0757   [facility]       Procedure Diagnosis Surgeon Provider    Exam under anesthesia, robotic-assisted total laparoscopic hysterectomy, bilateral salpingo-oophorectomy, possible staging, removal of any abnormal tissue, any indicated procedure (N/A Abdomen) Endometrial cancer  (Endometrial cancer [C54.1]) MD Terrell Connolly MD          Anesthesia Type: general  Last vitals  BP   134/77   Temp   98.7   Pulse  80   Resp   15   SpO2   97     Post Anesthesia Care and Evaluation    Patient location during evaluation: PACU  Patient participation: complete - patient participated  Level of consciousness: awake and alert  Pain score: 0  Pain management: adequate  Airway patency: patent  Anesthetic complications: No anesthetic complications  PONV Status: none  Cardiovascular status: hemodynamically stable and acceptable  Respiratory status: nonlabored ventilation, acceptable and nasal cannula  Hydration status: acceptable

## 2017-07-01 VITALS
TEMPERATURE: 97.6 F | DIASTOLIC BLOOD PRESSURE: 56 MMHG | SYSTOLIC BLOOD PRESSURE: 99 MMHG | BODY MASS INDEX: 37.22 KG/M2 | HEART RATE: 54 BPM | RESPIRATION RATE: 20 BRPM | HEIGHT: 64 IN | WEIGHT: 218 LBS | OXYGEN SATURATION: 100 %

## 2017-07-01 LAB
ANION GAP SERPL CALCULATED.3IONS-SCNC: 6 MMOL/L (ref 3–11)
BASOPHILS # BLD AUTO: 0 10*3/MM3 (ref 0–0.2)
BASOPHILS NFR BLD AUTO: 0 % (ref 0–1)
BUN BLD-MCNC: 12 MG/DL (ref 9–23)
BUN/CREAT SERPL: 12 (ref 7–25)
CALCIUM SPEC-SCNC: 9.1 MG/DL (ref 8.7–10.4)
CHLORIDE SERPL-SCNC: 109 MMOL/L (ref 99–109)
CO2 SERPL-SCNC: 24 MMOL/L (ref 20–31)
CREAT BLD-MCNC: 1 MG/DL (ref 0.6–1.3)
DEPRECATED RDW RBC AUTO: 45.8 FL (ref 37–54)
EOSINOPHIL # BLD AUTO: 0 10*3/MM3 (ref 0–0.3)
EOSINOPHIL NFR BLD AUTO: 0 % (ref 0–3)
ERYTHROCYTE [DISTWIDTH] IN BLOOD BY AUTOMATED COUNT: 13.1 % (ref 11.3–14.5)
GFR SERPL CREATININE-BSD FRML MDRD: 53 ML/MIN/1.73
GLUCOSE BLD-MCNC: 92 MG/DL (ref 70–100)
HCT VFR BLD AUTO: 38.5 % (ref 34.5–44)
HGB BLD-MCNC: 12 G/DL (ref 11.5–15.5)
IMM GRANULOCYTES # BLD: 0.05 10*3/MM3 (ref 0–0.03)
IMM GRANULOCYTES NFR BLD: 0.4 % (ref 0–0.6)
LYMPHOCYTES # BLD AUTO: 1.83 10*3/MM3 (ref 0.6–4.8)
LYMPHOCYTES NFR BLD AUTO: 13.6 % (ref 24–44)
MCH RBC QN AUTO: 29.8 PG (ref 27–31)
MCHC RBC AUTO-ENTMCNC: 31.2 G/DL (ref 32–36)
MCV RBC AUTO: 95.5 FL (ref 80–99)
MONOCYTES # BLD AUTO: 0.89 10*3/MM3 (ref 0–1)
MONOCYTES NFR BLD AUTO: 6.6 % (ref 0–12)
NEUTROPHILS # BLD AUTO: 10.72 10*3/MM3 (ref 1.5–8.3)
NEUTROPHILS NFR BLD AUTO: 79.4 % (ref 41–71)
PLATELET # BLD AUTO: 168 10*3/MM3 (ref 150–450)
PMV BLD AUTO: 9.7 FL (ref 6–12)
POTASSIUM BLD-SCNC: 4.1 MMOL/L (ref 3.5–5.5)
RBC # BLD AUTO: 4.03 10*6/MM3 (ref 3.89–5.14)
SODIUM BLD-SCNC: 139 MMOL/L (ref 132–146)
WBC NRBC COR # BLD: 13.49 10*3/MM3 (ref 3.5–10.8)

## 2017-07-01 PROCEDURE — 80048 BASIC METABOLIC PNL TOTAL CA: CPT | Performed by: OBSTETRICS & GYNECOLOGY

## 2017-07-01 PROCEDURE — 25010000002 ENOXAPARIN PER 10 MG: Performed by: OBSTETRICS & GYNECOLOGY

## 2017-07-01 PROCEDURE — 85025 COMPLETE CBC W/AUTO DIFF WBC: CPT | Performed by: OBSTETRICS & GYNECOLOGY

## 2017-07-01 RX ORDER — POLYETHYLENE GLYCOL 3350 17 G/17G
17 POWDER, FOR SOLUTION ORAL DAILY
Qty: 500 G | Refills: 0 | Status: SHIPPED | OUTPATIENT
Start: 2017-07-01 | End: 2017-07-10

## 2017-07-01 RX ORDER — ONDANSETRON 4 MG/1
4 TABLET, FILM COATED ORAL EVERY 6 HOURS PRN
Qty: 10 TABLET | Refills: 0 | Status: SHIPPED | OUTPATIENT
Start: 2017-07-01 | End: 2017-07-10 | Stop reason: SDUPTHER

## 2017-07-01 RX ORDER — IBUPROFEN 600 MG/1
600 TABLET ORAL EVERY 6 HOURS PRN
Qty: 20 TABLET | Refills: 0 | Status: SHIPPED | OUTPATIENT
Start: 2017-07-01 | End: 2017-07-10 | Stop reason: SINTOL

## 2017-07-01 RX ORDER — OXYCODONE HYDROCHLORIDE 5 MG/1
2.5-5 TABLET ORAL EVERY 4 HOURS PRN
Qty: 15 TABLET | Refills: 0 | Status: SHIPPED | OUTPATIENT
Start: 2017-07-01 | End: 2017-07-10 | Stop reason: SDUPTHER

## 2017-07-01 RX ADMIN — FAMOTIDINE 20 MG: 20 TABLET ORAL at 08:53

## 2017-07-01 RX ADMIN — OXYCODONE HYDROCHLORIDE 5 MG: 5 TABLET ORAL at 12:43

## 2017-07-01 RX ADMIN — ENOXAPARIN SODIUM 30 MG: 30 INJECTION SUBCUTANEOUS at 08:52

## 2017-07-01 RX ADMIN — POLYETHYLENE GLYCOL 3350 17 G: 17 POWDER, FOR SOLUTION ORAL at 08:52

## 2017-07-01 RX ADMIN — LEVOTHYROXINE SODIUM 100 MCG: 100 TABLET ORAL at 05:00

## 2017-07-01 RX ADMIN — ACETAMINOPHEN 650 MG: 325 TABLET, FILM COATED ORAL at 05:00

## 2017-07-01 RX ADMIN — OXYCODONE HYDROCHLORIDE 5 MG: 5 TABLET ORAL at 03:13

## 2017-07-01 RX ADMIN — ATENOLOL 50 MG: 50 TABLET ORAL at 12:25

## 2017-07-01 RX ADMIN — SODIUM CHLORIDE 100 ML/HR: 9 INJECTION, SOLUTION INTRAVENOUS at 09:27

## 2017-07-01 RX ADMIN — CITALOPRAM HYDROBROMIDE 20 MG: 20 TABLET ORAL at 08:53

## 2017-07-01 NOTE — PROGRESS NOTES
Gynecologic Oncology   Daily Progress Note    Chief Complaint: Postoperative    Subjective   Patient did well overnight.  Her pain is controlled.  She is not having nausea or emesis.  She is tolerating a regular diet.  She is voiding spontaneously and is passing gas.  She is ambulating.  She is using her incentive spirometer.    Her blood pressure medication was held this morning due to decreased blood pressure (90s/50s) including HCTZ.  She has had modest urinary output since capps was discontinued.  Bladder scan just now performed by nurse shows 150 mL in bladder.        Objective   Temp:  [97.5 °F (36.4 °C)-98.3 °F (36.8 °C)] 97.6 °F (36.4 °C)  Heart Rate:  [49-76] 54  Resp:  [14-20] 20  BP: ()/(55-87) 99/56  Vitals:    07/01/17 0750   BP: 99/56   Pulse: 54   Resp: 20   Temp: 97.6 °F (36.4 °C)   SpO2: 100%     I/O last 3 completed shifts:  In: 2800 [P.O.:300; I.V.:2500]  Out: 1500 [Urine:1350; Blood:150]     GENERAL: Alert, well-appearing female in no apparent distress.  Obese.  CARDIOVASCULAR: Normal rate, regular rhythm, no murmurs, rubs, or gallops.    RESPIRATORY: Clear to auscultation bilaterally, normal respiratory effort  GASTROINTESTINAL:  Soft, appropriately tender, non-distended, no rebound or guarding.  Positive bowel sounds.  Incisions c/d/i; dressings removed.  GENITOURINARY: capps previously removed.    SKIN:  Warm, dry, well-perfused.    PSYCHIATRIC: AO x3, with appropriate affect, normal thought processes  EXREMITIES: Symmetric. No peripheral edema. Teds in place.    Lab Results   Component Value Date    WBC 13.49 (H) 07/01/2017    HGB 12.0 07/01/2017    HCT 38.5 07/01/2017    MCV 95.5 07/01/2017     07/01/2017    NEUTROABS 10.72 (H) 07/01/2017    GLUCOSE 92 07/01/2017    BUN 12 07/01/2017    CREATININE 1.00 07/01/2017    EGFRIFNONA 53 (L) 07/01/2017     07/01/2017    K 4.1 07/01/2017     07/01/2017    CO2 24.0 07/01/2017    CALCIUM 9.1 07/01/2017         Assessment/Plan    Varsha Linares is a 81 y.o. female POD1 s/p extensive lysis of adhesions, robotic-assisted total laparoscopic hysterectomy, bilateral salpingo-oophorectomy, bilateral pelvic lymph node dissection, optimal tumor debulking to no gross residual visible disease for a Clinical Stage II serous endometrial cancer.    1.  Post-operative care  -Routine care (encourage ambulation, IS use, advance diet as tolerated, saline lock IV, bowel regimen, VTE prophylaxis)    2.  Chronic medical issues being treated during this hospital stay include:  -HTN  -Hypothyroid  -Dyslipidemia  -GERD  -IBS    3.  Disposition  -Hopefully, patient can be discharged today.  Plain is to give her home antihypertensives including diuretic as soon as her blood pressure normalizes and to monitor urine output.  She does not meet criteria for acute urinary retention presently.  -At the patient's request, I called patient's son.  He was in the shower but I discussed likely discharge with another family member.    Lenora Fink MD  07/01/17  11:03 AM

## 2017-07-01 NOTE — PLAN OF CARE
Problem: Pain, Acute (Adult)  Goal: Identify Related Risk Factors and Signs and Symptoms  Outcome: Outcome(s) achieved Date Met:  07/01/17

## 2017-07-01 NOTE — PLAN OF CARE
Problem: Patient Care Overview (Adult)  Goal: Plan of Care Review  Outcome: Ongoing (interventions implemented as appropriate)    07/01/17 0336   Coping/Psychosocial Response Interventions   Plan Of Care Reviewed With patient   Patient Care Overview   Progress improving   Outcome Evaluation   Outcome Summary/Follow up Plan Ariane guevara dc'ander this am. IVFs. Tolerating po. IVFs with abx. Anticipate discharge today       Goal: Adult Individualization and Mutuality  Outcome: Ongoing (interventions implemented as appropriate)  Goal: Discharge Needs Assessment  Outcome: Ongoing (interventions implemented as appropriate)    Problem: Perioperative Period (Adult)  Goal: Signs and Symptoms of Listed Potential Problems Will be Absent or Manageable (Perioperative Period)  Outcome: Ongoing (interventions implemented as appropriate)    Problem: Hysterectomy (Adult)  Goal: Signs and Symptoms of Listed Potential Problems Will be Absent or Manageable (Hysterectomy)  Outcome: Ongoing (interventions implemented as appropriate)

## 2017-07-01 NOTE — PLAN OF CARE
Problem: Hysterectomy (Adult)  Goal: Signs and Symptoms of Listed Potential Problems Will be Absent or Manageable (Hysterectomy)  Outcome: Outcome(s) achieved Date Met:  07/01/17

## 2017-07-01 NOTE — PLAN OF CARE
Problem: Pain, Acute (Adult)  Goal: Acceptable Pain Control/Comfort Level  Outcome: Outcome(s) achieved Date Met:  07/01/17

## 2017-07-01 NOTE — PLAN OF CARE
Problem: Perioperative Period (Adult)  Goal: Signs and Symptoms of Listed Potential Problems Will be Absent or Manageable (Perioperative Period)  Outcome: Outcome(s) achieved Date Met:  07/01/17

## 2017-07-01 NOTE — PLAN OF CARE
Problem: Patient Care Overview (Adult)  Goal: Adult Individualization and Mutuality  Outcome: Outcome(s) achieved Date Met:  07/01/17

## 2017-07-03 LAB
LAB AP CASE REPORT: NORMAL
Lab: NORMAL
PATH REPORT.FINAL DX SPEC: NORMAL

## 2017-07-05 LAB
CYTO UR: NORMAL
LAB AP CASE REPORT: NORMAL
LAB AP CLINICAL INFORMATION: NORMAL
Lab: NORMAL
Lab: NORMAL
PATH REPORT.FINAL DX SPEC: NORMAL
PATH REPORT.GROSS SPEC: NORMAL

## 2017-07-06 ENCOUNTER — TELEPHONE (OUTPATIENT)
Dept: GYNECOLOGIC ONCOLOGY | Facility: CLINIC | Age: 82
End: 2017-07-06

## 2017-07-06 NOTE — TELEPHONE ENCOUNTER
GYN Oncology    Telephone Call    I called and spoke with the patient about her pathology results - stage 2 disease.  Needs office pathology discussion to review options for adjuvant therapy.  I've asked for her to come in on 7/11/17 at 2 PM.    She is feeling slightly better each day from surgery.  She states she does have some weakness and fatigue.  She is not taking the narcotic.  Her appetite has been poor.  No fevers.  No dysuria.  States she has been drinking.  I gave her precautions and asked her to call sooner than 7/11/17 if her symptoms progress or worsen.      Pretty Ngo MD  07/06/17  11:36 AM    Addendum  Patient called back and prefers to be seen on 7/10/17 at 10:15 AM.    Pretty Ngo MD  07/06/17  11:55 AM

## 2017-07-10 ENCOUNTER — OFFICE VISIT (OUTPATIENT)
Dept: GYNECOLOGIC ONCOLOGY | Facility: CLINIC | Age: 82
End: 2017-07-10

## 2017-07-10 VITALS
SYSTOLIC BLOOD PRESSURE: 170 MMHG | TEMPERATURE: 97.2 F | DIASTOLIC BLOOD PRESSURE: 78 MMHG | HEART RATE: 69 BPM | RESPIRATION RATE: 18 BRPM | OXYGEN SATURATION: 93 % | BODY MASS INDEX: 38.28 KG/M2 | WEIGHT: 223 LBS

## 2017-07-10 DIAGNOSIS — C79.82: ICD-10-CM

## 2017-07-10 DIAGNOSIS — R11.2 NON-INTRACTABLE VOMITING WITH NAUSEA, UNSPECIFIED VOMITING TYPE: ICD-10-CM

## 2017-07-10 DIAGNOSIS — C54.1 ENDOMETRIAL CANCER (HCC): Primary | ICD-10-CM

## 2017-07-10 DIAGNOSIS — G89.18 POSTOPERATIVE PAIN: ICD-10-CM

## 2017-07-10 PROCEDURE — 99024 POSTOP FOLLOW-UP VISIT: CPT | Performed by: OBSTETRICS & GYNECOLOGY

## 2017-07-10 RX ORDER — OXYCODONE HYDROCHLORIDE 5 MG/1
2.5-5 TABLET ORAL EVERY 4 HOURS PRN
Qty: 25 TABLET | Refills: 0 | Status: SHIPPED | OUTPATIENT
Start: 2017-07-10 | End: 2017-11-29 | Stop reason: SDUPTHER

## 2017-07-10 RX ORDER — ONDANSETRON 4 MG/1
4 TABLET, FILM COATED ORAL EVERY 6 HOURS PRN
Qty: 15 TABLET | Refills: 0 | Status: SHIPPED | OUTPATIENT
Start: 2017-07-10 | End: 2017-11-29 | Stop reason: SDUPTHER

## 2017-07-10 NOTE — PROGRESS NOTES
Varsha Linares  [id number]  1935    Subjective     Reason for visit:  Pathology discussion and treatment planning    History of present illness:  The patient is a 81 y.o. 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  She recently underwent minimally invasive staging surgery.  She presents today for pathology discussion.    Today the patient notes that she has been recovering reasonably well but slowly since surgery.  She still feels very weak.  She does admit that her energy is much improved compared to a week ago.  She still has some nausea with eating.  She can go 2-3 days at a time without requiring any narcotic pain medication.  If she does take the medication she takes approximately 1-2 per day.  She states she has decreased urine output despite adequate oral intake.  Normal bowel movements.  No fevers or chills.    Treatment History:    1.  EMB with Dr. Manning showed showed endometrial adenocarcinoma with papillary features and foci of high-grade histology.  Comments stated overall grade 2 with foci of grade 3.    2.  Pre-operative  was 29.  Preoperative CT of the abdomen/pelvis showed an 8 mm node anterior to the left common iliac vein or the proximal left external iliac vein which is a singular lymph node and is  noteworthy for possible exploration during the surgical resection of the uterus.  3.  On 6/30/17 she underwent a robotic-assisted total laparoscopic hysterectomy, bilateral salpingo-oophorectomy, and bilateral pelvic lymph node dissection.  Her surgery was challenging given the extent of her diverticular disease and adhesions to the left adnexa as well as her significant visceral adiposity.   4.  Final pathology returned showing high-grade papillary serous adenocarcinoma involving the cervix, endocervix, and tumor extending into the left parametrium.  Tumor size was 3.5 cm.  There was full-thickness myometrial invasion.  +LVSI.  There were 0/4  right pelvic lymph nodes and 0/4 left pelvic lymph nodes.  Washings were negative.    OBGYN History: NSVDx2.  No history of abnormal paps.  Prior RSO via Pfannenstiel for ovarian cysts.    Past Medical History:   Diagnosis Date   • Anxiety    • Arthritis    • Back pain    • BMI 37.0-37.9, adult    • Dyslipidemia    • Endometrial cancer    • GERD (gastroesophageal reflux disease)    • Herniated disc, cervical     and lower back    • HTN (hypertension)    • Hypothyroidism    • Spinal stenosis    • Spinal stenosis     lumbar    • Wears glasses    • Wears partial dentures     upper       Past Surgical History:   Procedure Laterality Date   • APPENDECTOMY OPEN     • BACK SURGERY      x2 lumbar      • BREAST BIOPSY Right     x2, benign disease   • CATARACT EXTRACTION      bilat with lens   2008?   • CHOLECYSTECTOMY OPEN  1987   • COLONOSCOPY     • RIGHT OOPHORECTOMY  1969    Pfannenstiel.  Ovarian cysts.   • TONSILLECTOMY      unsure of adnoids    • TOTAL KNEE ARTHROPLASTY Left 2007   • TOTAL LAPAROSCOPIC HYSTERECTOMY N/A 6/30/2017    Staging for enometrial cancer   • WISDOM TOOTH EXTRACTION         MEDICATIONS: The current medication list was reviewed with the patient and updated in the EMR this date per the Medical Assistant. Medication dosages and frequencies were confirmed to be accurate.      Allergies:  has No Known Allergies.    Social History: She is .  She lives 45 min away in [place].  She lives by herself and does not work.  She denies use of tobacco, alcohol, and illicit drugs.     Family History:  Denies a history of breast, colon, endometrial, ovarian, and prostate cancer.  No history of melanoma.    Health Maintenance:    1. Mammogram - 12/2015  2. Colonoscopy - 2002    Review of Systems:  CONSTITUTIONAL:  Weight has been stable.  +fatigue.  No fever.  No chills, night sweats.  PSYCHIATRIC: +anxiety.  No history of depression.  No bipolar disorder or insomnia.  EYES: Vision is unchanged.  No  photophobia.  EARS, NOSE, MOUTH, AND THROAT: Hearing normal. No swallowing difficulties.  No sore throat.  ENDOCRINE: No history of diabetes.  +thyroid disease.  LYMPHATIC: No enlarged lymph nodes  RESPIRATORY: No shortness of breath, cough, asthma or wheezing.  No hemoptysis.  CARDIOVASCULAR: No angina, orthopnea.  No edema.  +HTN.  +hyperlipidemia.  GASTROINTESTINAL: No constipation.  No diarrhea.  No melena.  +reflux.  No nausea.  No vomiting.  GENITOURINARY: No dysuria, hematuria, urgency, or frequency.  NEUROLOGIC: No numbness, weakness, motor disturbance, syncope, seizure, or headaches.  MUSCULOSKELETAL: +joint pain.  No muscle weakness.  INTEGUMENTARY: No new skin lesions.  GYNECOLOGIC: Per history of present illness.  HEMATOLOGIC: No bleeding problems.  Denies easy bruising.    Objective      Physical Exam  Vitals:    07/10/17 1015   BP: 170/78   Pulse: 69   Resp: 18   Temp: 97.2 °F (36.2 °C)   SpO2: 93%     Body mass index is 38.28 kg/(m^2).    Wt Readings from Last 3 Encounters:   07/10/17 223 lb (101 kg)   06/30/17 218 lb (98.9 kg)   06/29/17 218 lb 14.7 oz (99.3 kg)     ECOG PS 0    GENERAL: Alert, well-appearing elderly female in no apparent distress.  She appears her stated age.  She is here with her son.  HEENT: Sclera anicteric, normal eyelids. Head normocephalic, atraumatic. Mucus membranes moist.  Normal dentition.    NECK: Trachea midline, supple, without masses.  No thyromegaly.      CARDIOVASCULAR: Normal rate, regular rhythm, no murmurs, rubs, or gallops.  Extremities symmetric.  No peripheral edema.  RESPIRATORY: Clear to auscultation bilaterally, normal respiratory effort  GASTROINTESTINAL:  Soft, obese, non-tender, non-distended, no rebound or guarding, no masses, or hernias.  No HSM.  Incisions - her recent laparoscopic incisions are healing.  There is bruising at her pannus.  MUSCULOSKELETAL:  Normal gait and station.  FROMx4.  No digital cyanosis.    SKIN:  Warm, dry, well-perfused.  All  visible areas intact.  No rashes, lesions, ulcers.  PSYCHIATRIC: AO x3, with appropriate affect, normal thought processes  PELVIC: Deferred      Diagnostic Data:    CT Abdomen/Pelvis 6/29/17  FINDINGS:   1. The liver is intrinsically within normal limits without evidence of  focal liver mass. The gallbladder has been removed and the gallbladder  fossa is clear.  The spleen is unremarkable. The adrenal glands are normal.  2. The pancreas is atrophic and mildly fatty replaced. Pancreatic mass  or cyst is not currently identified.  There are cysts associated with the right kidney. Solid renal mass,  renal stone or renal obstruction is not identified. The retrocrural  space is clear.  3. The periaortic retroperitoneum is unremarkable. Only scattered normal  lymph nodes are seen around the aorta in the mid to lower abdomen.  4. There is an 8 mm node anterior to the left common iliac vein or the  proximal left external iliac vein which is a singular lymph node and is  noteworthy for possible exploration during the surgical resection of the  uterus.  No other dominant or suspect pelvic node is identified. The internal  iliac lisa chain appears to be clear.  5. There is marked diverticulosis of the sigmoid colon.  6. The uterus is anteverted and has abnormal attenuation with a fundic  mass and a projection of nodularity from the leftward fundus. Ascites,  stranding, caking or other acute abnormality is not identified. The  lower lung zones are clear.  IMPRESSION:  1. There is a borderline enlarged lymph node anterior to the left iliac  vein which is suspect considering the patient's uterine malignancy.  Bulky adenopathy is certainly not identified and this is relatively  subtle. The remainder of the internal iliac lisa chain is unremarkable.  The lateral pelvic sidewalls are clear. There is evidence of uterine  mass with mixed attenuation.  2. There is marked skeletal axial arthropathy with dextroscoliosis of  the lumbar  spine and multilevel degenerative disc disease in the absence  of osteolysis or blastic disease. The patient has marked pancolonic  diverticulosis, most severe in the descending colon and sigmoid.  3. The upper abdomen and upper abdominal viscera is within normal  limits otherwise. No other evidence of distant neoplasm is identified  from these data sets.    Lab Results   Component Value Date     29.0 06/29/2017 6/30/17 Final Diagnosis   1. UTERUS WITH CERVIX, LEFT TUBE AND OVARY:  Infiltrating high-grade papillary serous adenocarcinoma with involvement of cervix, endocervical stroma, and Positive cervical stromal margin and lymphvascular invasion.  Tumor size approximately 3.5 x 2.4 x 1.3 cm.   High-grade serous adenocarcinomatous involvement of endometrial polyp.  Positive left parametrial margin.  Myometrial leiomyoma and adenomyosis.  Ovarian atrophy and benign epithelial inclusions, no tumor identified.   Histologically unremarkable fallopian tube.   2. RIGHT OBTURATOR LYMPH NODE:  Lymph node x1, lymphoid aggregates without lymph node capsule, no tumor or granulomas identified.   3. RIGHT PELVIC LYMPH NODE:  Lymph node x3, no tumor or granulomas identified.   4. LEFT PELVIC LYMPH NODE:  Lymph node x4, no tumor or granulomas identified.   UTERUS ENDOMETRIUM TEMPLATE:  SPECIMEN TYPE/ PROCEDURE: hysterectomy and salpingo-oophorectomy  LYMPH NODE SAMPLING: yes  SPECIMEN INTEGRITY: Cervix amputated from remainder of uterus  TUMOR SITE: Lower uterine segment and endometrial cavity  TUMOR SIZE (greatest dimension): approximately 3.5 x 2.4 x 1.3 cm.  HISTOLOGIC TYPE: papillary serous adenocarcinoma   HISTOLOGIC GRADE: high-grade  MYOMETRIAL INVASION: tumor extends to the lower uterine segment-endocervical radial margin  INVOLVEMENT OF CERVIX: yes  EXTENT OF INVOLVEMENT OF OTHER ORGANS: inderminant  MARGINS: Positive for tumor  LYMPHVASCULAR INVASION: present  MSI TESTING (under age 60): Not  performed  REGIONAL LYMPH NODE STATUS: Yes, nodes negative  ADDITIONAL PATHOLOGIC FINDINGS: Leiomyoma and adenomyosis, tumor extends into left parametrium  OTHER STUDIES: none         Assessment/Plan  This is a 81 y.o. 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    1.  Endometrial cancer:  I reviewed her pathology report with her and her son in detail and [place] pictures to illustrate the findings.  She was also given a copy of the report.  They understand the body of knowledge and treating endometrial cancer continues to grow.  They understand that at the present moment we know that combined therapy with chemotherapy and radiation provides the most benefit however the optimal sequencing is still being determined.  I typically deliver in a sandwich fashion.  With papillary serous histology in particular chemotherapy is felt to be the most important component of treatment.  I explained that recurrences often occur at distant sites.    We reviewed that she has particularly high risk tumor features including the aggressive histology, advanced stage, lymphovascular space invasion, and full-thickness myometrial disease.  They understand that statistics are not intended to be applied to the individual patient, however, in terms of prognosis all comers with stage 3 disease typically have an overall 5 year survival of 30%.  Again they understand it is difficult to extrapolate to the individual however it would be our hope with combined therapy, as has been shown with data, to decrease her risk of recurrence and prolong her overall survival.      We reviewed the inherent side effects of Carboplatin/Paclitaxel chemotherapy, including but not limited to: bone marrow suppression, peripheral neuropathy, fatigue, alopecia, constipation, and less commonly nausea/vomiting, allergic reaction, extravasation.  We discussed the typical treatment schedule of external beam radiation therapy followed by vaginal  brachytherapy and the often cited side effects.    Additionally I think it is helpful for patients with a new cancer diagnosis facing adjuvant therapy such as herself to address the topic of getting a second opinion.  I shared that I am supportive of this if it any point in time, whether now, or in the future, she or her family wish to pursue this, I would be pleased to assist her.    2.  Customization of care plan:  She is very emotional regarding cancer and cancer treatment in general as her daughter  of stage IV breast cancer.  The patient assisted through the majority of her chemotherapy and radiation and felt her daughter had many side effects and suffered.  She cries very easily was offered support.  She understands my role is to provide her with information and treatment options and for us to then decide together the best course of action for her.  At this point in time she would like to forego any external beam radiation therapy as she feels it would be to hard on her.  She is considering, and likely will be accepting of, chemotherapy and brachy.  I would like for her to consider the information presented today over the next couple days and I will call her to follow-up.    3.  Coordination of care: I explained the risks, benefits, and alternatives to Port-A-Cath placement and she will consider this.  It sounds like she is leaning towards peripheral access.     4.  Medical comorbidities:  She is almost 82 years old and this should be taken into account during treatment planning    5.  The primary reason for this visit was unrelated to the postoperative care of this patient.  I did refill her oxycodone and Zofran.    At least 50 of 60 minutes was spent face to face counseling the patient on her cancer diagnosis, prognosis, recommendation for adjuvant treatment, and toxicities of therapy.        Electronically Signed by: Pretty Ngo MD  Date: 7/10/2017      Time:  3:27 PM    Note: Speech  recognition transcription software was used to dictate portions of this document.  An attempt at proofreading has been made though minor errors in transcription may still be present.  Please do not hesitate to call our office with any questions.

## 2017-07-12 DIAGNOSIS — C54.1 ENDOMETRIAL CANCER (HCC): Primary | ICD-10-CM

## 2017-07-12 NOTE — PROGRESS NOTES
GYN Oncology    Telephone Call    I called and spoke with patient and her son to touch base regarding the other day.    They have decided to proceed with chemo.  I recommend starting with weekly carbo/weekly taxol given her age.  No port.    I placed an order for the chest CT for staging and my office will contact her to schedule (maybe the same day as chemo education).    Ideally chemo to start the week of 7/24.  RN to put in care plan.    Pretty Ngo MD  07/12/17  6:33 PM

## 2017-07-13 ENCOUNTER — TELEPHONE (OUTPATIENT)
Dept: GYNECOLOGIC ONCOLOGY | Facility: CLINIC | Age: 82
End: 2017-07-13

## 2017-07-13 DIAGNOSIS — C54.1 ENDOMETRIAL CANCER (HCC): Primary | ICD-10-CM

## 2017-07-13 RX ORDER — LORATADINE 10 MG/1
TABLET ORAL
Qty: 40 TABLET | Refills: 1 | Status: SHIPPED | OUTPATIENT
Start: 2017-07-13 | End: 2017-12-18

## 2017-07-13 RX ORDER — PROCHLORPERAZINE MALEATE 10 MG
10 TABLET ORAL EVERY 6 HOURS PRN
Qty: 30 TABLET | Refills: 5 | Status: SHIPPED | OUTPATIENT
Start: 2017-07-13 | End: 2018-06-07 | Stop reason: SDUPTHER

## 2017-07-13 RX ORDER — DEXAMETHASONE 4 MG/1
TABLET ORAL
Qty: 45 TABLET | Refills: 1 | Status: SHIPPED | OUTPATIENT
Start: 2017-07-13 | End: 2017-12-18

## 2017-07-13 RX ORDER — ONDANSETRON HYDROCHLORIDE 8 MG/1
8 TABLET, FILM COATED ORAL 3 TIMES DAILY PRN
Qty: 30 TABLET | Refills: 5 | Status: SHIPPED | OUTPATIENT
Start: 2017-07-13 | End: 2017-11-29

## 2017-07-13 NOTE — TELEPHONE ENCOUNTER
I called and spoke with pt.  I informed her that medications she will need before and after chemotherapy have been sent to her pharmacy.  I told her that I will be out of town and that our oncology pharmacist, Jessica, will be doing her chemo education and that hopefully this will be able to be scheduled on the same day as her chest CT.  She asked that her son Ru be notified.      I called Ru and went over the above with him and discussed that chemo will be scheduled the week of July 24th. I told him that someone from our office will be contacting him and pt with all appointment information.  He v/u.

## 2017-07-17 ENCOUNTER — TELEPHONE (OUTPATIENT)
Dept: GYNECOLOGIC ONCOLOGY | Facility: CLINIC | Age: 82
End: 2017-07-17

## 2017-07-18 ENCOUNTER — EDUCATION (OUTPATIENT)
Dept: PHARMACY | Facility: HOSPITAL | Age: 82
End: 2017-07-18

## 2017-07-18 ENCOUNTER — HOSPITAL ENCOUNTER (OUTPATIENT)
Dept: CT IMAGING | Facility: HOSPITAL | Age: 82
Discharge: HOME | End: 2017-07-18
Attending: OBSTETRICS & GYNECOLOGY | Admitting: OBSTETRICS & GYNECOLOGY

## 2017-07-18 ENCOUNTER — DOCUMENTATION (OUTPATIENT)
Dept: GYNECOLOGIC ONCOLOGY | Facility: CLINIC | Age: 82
End: 2017-07-18

## 2017-07-18 DIAGNOSIS — C54.1 ENDOMETRIAL CANCER (HCC): ICD-10-CM

## 2017-07-18 PROCEDURE — 71260 CT THORAX DX C+: CPT

## 2017-07-18 PROCEDURE — 0 IOPAMIDOL 61 % SOLUTION: Performed by: OBSTETRICS & GYNECOLOGY

## 2017-07-18 RX ADMIN — IOPAMIDOL 95 ML: 612 INJECTION, SOLUTION INTRAVENOUS at 15:41

## 2017-07-18 NOTE — PROGRESS NOTES
Chemo education packet given to patient and son to read over, explained medications which son had already picked up from pharmacy.  Pt declined signing consent for chemotherapy until CT Scan results are back.  States she will decide whether she wants it or not depending on the results of the scan.

## 2017-07-18 NOTE — PLAN OF CARE
Outpatient Infusion • [address] [facility] • [address] • [place] [id number] • [phone]      CHEMOTHERAPY EDUCATION SHEET    NAME:  Varsha Linares      : 1935           DATE: 17    Booklets Given: Chemotherapy and You [x]  Eating Hints [x]    Sexuality/Fertility Books []     Chemotherapy/Biotherapy Education Sheets: (list all that apply)  Carboplatin, Paclitaxel                                                                                                                                                                 Chemotherapy Regimen:  Carboplatin + Paclitaxel weekly     TOPICS EDUCATION PROVIDED EDUCATION REINFORCED COMMENTS   ANEMIA:  role of RBC, cause, s/s, ways to manage, role of transfusion [x] [] Spoke about the role of RBCs, how chemotherapy can cause fatigue, and how to manage that with rest as well as continuing to do some activity daily.   THROMBOCYTOPENIA:  role of platelet, cause, s/s, ways to prevent bleeding, things to avoid, when to seek help [x] [] Talked about platelets and their role, as well as symptoms of bleeding, especially serious signs such as nose bleeds and blood in stool/urine, how to manage, and when to call the doctor.   NEUTROPENIA:  role of WBC, cause, infection precautions, s/s of infection, when to call MD [x] [] Defined the role of WBCs, how they fight off infection, and provided education on infection prevention, hand hygeine, and signs of infection.   NUTRITION & APPETITE CHANGES:  importance of maintaining healthy diet & weight, ways to manage to improve intake, dietary consult, exercise regimen [x] [] Spoke about nutrition importance, and recommended Boost/Ensure for the patient because she has not had a good appetite recently   DIARRHEA:  causes, s/s of dehydration, ways to manage, dietary changes, when to call MD [x] [] Discussed how to manage diarrhea with Imodium, importance of hydration, and ways to stay hydrated.   CONSTIPATION:  causes, ways  to manage, dietary changes, when to call MD [x] [] Talked about how to manage constipation with Miralax/stool softener, and importance of daily bowel movements.   NAUSEA & VOMITING:  cause, use of antiemetics, dietary changes, when to call MD [x] [] Talked extensively about nausea and antiemetic medications. Patient brought in prescribed medications for nausea, and we talked about them and when to take them in regards to her chemotherapy regimen.   MOUTH SORES:  causes, oral care, ways to manage [x] [] Discussed potential for mouth sores, how to prophylactically treat with baking soda/salt, as well as avoiding Listerine and other mouthwashes containing alcohol.   ALOPECIA:  cause, ways to manage, resources [x] [] Discussed the possibility of hair loss - MD spoke to this with the patient in more depth.   INFERTILITY & SEXUALITY:  causes, fertility preservation options, sexuality changes, ways to manage, importance of birth control [x] [] Spoke about safe sex practices with the patient.   NERVOUS SYSTEM CHANGES:  causes, s/s, neuropathies, cognitive changes, ways to manage [x] [] Discussed baseline neuropathy status, and potential of neuropathy worsening - how to manage.   PAIN:  causes, ways to manage [x] [] Spoke about pain, how to manage, focusing on use of Tylenol and not NSAIDs - patient does take ASA 81mg, counseled to continue therapy. ????   SKIN & NAIL CHANGES:  cause, s/s, ways to manage [] []    ORGAN TOXICITIES:  cause, s/s, need for diagnostic tests, labs, when to notify MD [] []    SURVIVORSHIP:  distress, distress assessment, secondary malignancies, early/late effects, follow-up, social issues, social support [] []    HOME CARE:  use of spill kits, storing of PO chemo, how to manage bodily fluids [] []    MISCELLANEOUS:  drug interactions, administration, vesicant, etc [x] [] Spoke about infusion expectations, pre-medications, and labs.        Notes: Patient is scheduled for CT today - she would like to  know results of CT scan before making a decision about whether to choose to receive chemotherapy. We provided her with information detailed above about potential effects and what to expect from her chemotherapy.     Nai Carlson, PharmD  7/18/2017  3:31 PM

## 2017-07-20 ENCOUNTER — TELEPHONE (OUTPATIENT)
Dept: GYNECOLOGIC ONCOLOGY | Facility: CLINIC | Age: 82
End: 2017-07-20

## 2017-07-20 NOTE — TELEPHONE ENCOUNTER
GYN Oncology    Telephone Call    I called and spoke with the patient and her son about her negative chest CT.  She will see me next week in the office on 7/26 for an office visit and chemo.    Pretty Ngo MD  07/20/17  12:44 PM

## 2017-07-21 ENCOUNTER — TELEPHONE (OUTPATIENT)
Dept: SOCIAL WORK | Facility: HOSPITAL | Age: 82
End: 2017-07-21

## 2017-07-21 NOTE — TELEPHONE ENCOUNTER
SW received a phone call from pt asking questions about how much the chemotherapy treatments will cost and the amount for which she may be responsible, as she has Medicare only.  SW explained the financial assistance program here at [facility] and assured her that if she is on a limited income, she may qualify for help on those bills.  Pt states that she is trying to decide whether or not to proceed with chemo.  SW encouraged pt to think it over and at least to commit to coming to her first scheduled treatment, see how she feels and then take it from there, explaining that she can change her mind at any time.  SW will plan to meet with pt at her first treatment next Wednesday.  SW provided support and encouraged pt to call with any future needs or concerns.  SW available for ongoing support and resource needs.

## 2017-07-24 DIAGNOSIS — C54.1 ENDOMETRIAL CANCER (HCC): ICD-10-CM

## 2017-07-24 DIAGNOSIS — Z79.899 ON ANTINEOPLASTIC CHEMOTHERAPY: ICD-10-CM

## 2017-07-26 ENCOUNTER — DOCUMENTATION (OUTPATIENT)
Dept: NUTRITION | Facility: HOSPITAL | Age: 82
End: 2017-07-26

## 2017-07-26 ENCOUNTER — DOCUMENTATION (OUTPATIENT)
Dept: SOCIAL WORK | Facility: HOSPITAL | Age: 82
End: 2017-07-26

## 2017-07-26 ENCOUNTER — EDUCATION (OUTPATIENT)
Dept: ONCOLOGY | Facility: HOSPITAL | Age: 82
End: 2017-07-26

## 2017-07-26 ENCOUNTER — OFFICE VISIT (OUTPATIENT)
Dept: GYNECOLOGIC ONCOLOGY | Facility: CLINIC | Age: 82
End: 2017-07-26

## 2017-07-26 ENCOUNTER — INFUSION (OUTPATIENT)
Dept: ONCOLOGY | Facility: HOSPITAL | Age: 82
End: 2017-07-26

## 2017-07-26 VITALS
OXYGEN SATURATION: 96 % | WEIGHT: 216 LBS | SYSTOLIC BLOOD PRESSURE: 179 MMHG | RESPIRATION RATE: 20 BRPM | TEMPERATURE: 96.8 F | HEART RATE: 65 BPM | DIASTOLIC BLOOD PRESSURE: 86 MMHG | BODY MASS INDEX: 37.08 KG/M2

## 2017-07-26 VITALS — DIASTOLIC BLOOD PRESSURE: 64 MMHG | HEART RATE: 52 BPM | SYSTOLIC BLOOD PRESSURE: 156 MMHG

## 2017-07-26 DIAGNOSIS — Z79.899 ON ANTINEOPLASTIC CHEMOTHERAPY: Primary | ICD-10-CM

## 2017-07-26 DIAGNOSIS — C54.1 ENDOMETRIAL CANCER (HCC): ICD-10-CM

## 2017-07-26 DIAGNOSIS — R53.83 FATIGUE, UNSPECIFIED TYPE: ICD-10-CM

## 2017-07-26 DIAGNOSIS — F41.9 ANXIETY: ICD-10-CM

## 2017-07-26 DIAGNOSIS — F43.81 COMPLEX GRIEF DISORDER LASTING LONGER THAN 12 MONTHS: ICD-10-CM

## 2017-07-26 DIAGNOSIS — Z79.899 ON ANTINEOPLASTIC CHEMOTHERAPY: ICD-10-CM

## 2017-07-26 DIAGNOSIS — C54.1 ENDOMETRIAL CANCER (HCC): Primary | ICD-10-CM

## 2017-07-26 DIAGNOSIS — R53.81 PHYSICAL DECONDITIONING: ICD-10-CM

## 2017-07-26 LAB
ALBUMIN SERPL-MCNC: 4.3 G/DL (ref 3.2–4.8)
ALBUMIN/GLOB SERPL: 1.8 G/DL (ref 1.5–2.5)
ALP SERPL-CCNC: 71 U/L (ref 25–100)
ALT SERPL W P-5'-P-CCNC: 19 U/L (ref 7–40)
ANION GAP SERPL CALCULATED.3IONS-SCNC: 9 MMOL/L (ref 3–11)
AST SERPL-CCNC: 22 U/L (ref 0–33)
BACTERIA UR QL AUTO: ABNORMAL /HPF
BILIRUB SERPL-MCNC: 0.6 MG/DL (ref 0.3–1.2)
BILIRUB UR QL STRIP: NEGATIVE
BUN BLD-MCNC: 18 MG/DL (ref 9–23)
BUN/CREAT SERPL: 16.4 (ref 7–25)
CALCIUM SPEC-SCNC: 9.7 MG/DL (ref 8.7–10.4)
CHLORIDE SERPL-SCNC: 102 MMOL/L (ref 99–109)
CLARITY UR: ABNORMAL
CO2 SERPL-SCNC: 26 MMOL/L (ref 20–31)
COLOR UR: YELLOW
CREAT BLD-MCNC: 1.1 MG/DL (ref 0.6–1.3)
CREAT BLDA-MCNC: 1.1 MG/DL (ref 0.6–1.3)
ERYTHROCYTE [DISTWIDTH] IN BLOOD BY AUTOMATED COUNT: 13 % (ref 11.3–14.5)
GFR SERPL CREATININE-BSD FRML MDRD: 48 ML/MIN/1.73
GLOBULIN UR ELPH-MCNC: 2.4 GM/DL
GLUCOSE BLD-MCNC: 144 MG/DL (ref 70–100)
GLUCOSE UR STRIP-MCNC: NEGATIVE MG/DL
HCT VFR BLD AUTO: 41.1 % (ref 34.5–44)
HGB BLD-MCNC: 13.4 G/DL (ref 11.5–15.5)
HGB UR QL STRIP.AUTO: ABNORMAL
HYALINE CASTS UR QL AUTO: ABNORMAL /LPF
KETONES UR QL STRIP: NEGATIVE
LEUKOCYTE ESTERASE UR QL STRIP.AUTO: ABNORMAL
LYMPHOCYTES # BLD AUTO: 2.1 10*3/MM3 (ref 0.6–4.8)
LYMPHOCYTES NFR BLD AUTO: 20.4 % (ref 24–44)
MCH RBC QN AUTO: 29.9 PG (ref 27–31)
MCHC RBC AUTO-ENTMCNC: 32.7 G/DL (ref 32–36)
MCV RBC AUTO: 91.4 FL (ref 80–99)
MONOCYTES # BLD AUTO: 0.2 10*3/MM3 (ref 0–1)
MONOCYTES NFR BLD AUTO: 1.6 % (ref 0–12)
NEUTROPHILS # BLD AUTO: 8 10*3/MM3 (ref 1.5–8.3)
NEUTROPHILS NFR BLD AUTO: 78 % (ref 41–71)
NITRITE UR QL STRIP: NEGATIVE
PH UR STRIP.AUTO: 8 [PH] (ref 5–8)
PLATELET # BLD AUTO: 244 10*3/MM3 (ref 150–450)
PMV BLD AUTO: 6.8 FL (ref 6–12)
POTASSIUM BLD-SCNC: 4 MMOL/L (ref 3.5–5.5)
PROT SERPL-MCNC: 6.7 G/DL (ref 5.7–8.2)
PROT UR QL STRIP: NEGATIVE
RBC # BLD AUTO: 4.49 10*6/MM3 (ref 3.89–5.14)
RBC # UR: ABNORMAL /HPF
REF LAB TEST METHOD: ABNORMAL
SODIUM BLD-SCNC: 137 MMOL/L (ref 132–146)
SP GR UR STRIP: 1.01 (ref 1–1.03)
SQUAMOUS #/AREA URNS HPF: ABNORMAL /HPF
UROBILINOGEN UR QL STRIP: ABNORMAL
WBC NRBC COR # BLD: 10.3 10*3/MM3 (ref 3.5–10.8)
WBC UR QL AUTO: ABNORMAL /HPF

## 2017-07-26 PROCEDURE — 82565 ASSAY OF CREATININE: CPT

## 2017-07-26 PROCEDURE — 25010000002 LORAZEPAM PER 2 MG: Performed by: OBSTETRICS & GYNECOLOGY

## 2017-07-26 PROCEDURE — 96375 TX/PRO/DX INJ NEW DRUG ADDON: CPT

## 2017-07-26 PROCEDURE — 96413 CHEMO IV INFUSION 1 HR: CPT

## 2017-07-26 PROCEDURE — 25010000002 DEXAMETHASONE PER 1 MG: Performed by: OBSTETRICS & GYNECOLOGY

## 2017-07-26 PROCEDURE — 25010000002 DIPHENHYDRAMINE PER 50 MG: Performed by: OBSTETRICS & GYNECOLOGY

## 2017-07-26 PROCEDURE — 96417 CHEMO IV INFUS EACH ADDL SEQ: CPT

## 2017-07-26 PROCEDURE — 96360 HYDRATION IV INFUSION INIT: CPT

## 2017-07-26 PROCEDURE — 81001 URINALYSIS AUTO W/SCOPE: CPT

## 2017-07-26 PROCEDURE — 36415 COLL VENOUS BLD VENIPUNCTURE: CPT

## 2017-07-26 PROCEDURE — 25010000002 CARBOPLATIN PER 50 MG: Performed by: OBSTETRICS & GYNECOLOGY

## 2017-07-26 PROCEDURE — 25010000002 PACLITAXEL PER 30 MG: Performed by: OBSTETRICS & GYNECOLOGY

## 2017-07-26 PROCEDURE — 80053 COMPREHEN METABOLIC PANEL: CPT

## 2017-07-26 PROCEDURE — 87086 URINE CULTURE/COLONY COUNT: CPT

## 2017-07-26 PROCEDURE — 96376 TX/PRO/DX INJ SAME DRUG ADON: CPT

## 2017-07-26 PROCEDURE — 85025 COMPLETE CBC W/AUTO DIFF WBC: CPT

## 2017-07-26 PROCEDURE — 99024 POSTOP FOLLOW-UP VISIT: CPT | Performed by: OBSTETRICS & GYNECOLOGY

## 2017-07-26 PROCEDURE — 25010000002 PALONOSETRON PER 25 MCG: Performed by: OBSTETRICS & GYNECOLOGY

## 2017-07-26 RX ORDER — SODIUM CHLORIDE 9 MG/ML
500 INJECTION, SOLUTION INTRAVENOUS ONCE
Status: CANCELLED | OUTPATIENT
Start: 2017-08-02

## 2017-07-26 RX ORDER — SODIUM CHLORIDE 9 MG/ML
500 INJECTION, SOLUTION INTRAVENOUS ONCE
Status: CANCELLED | OUTPATIENT
Start: 2017-08-09

## 2017-07-26 RX ORDER — PALONOSETRON 0.05 MG/ML
0.25 INJECTION, SOLUTION INTRAVENOUS ONCE
Status: CANCELLED | OUTPATIENT
Start: 2017-07-26

## 2017-07-26 RX ORDER — FAMOTIDINE 10 MG/ML
20 INJECTION, SOLUTION INTRAVENOUS ONCE
Status: CANCELLED | OUTPATIENT
Start: 2017-07-26

## 2017-07-26 RX ORDER — LORAZEPAM 2 MG/ML
0.5 INJECTION INTRAMUSCULAR
Status: CANCELLED
Start: 2017-07-26 | End: 2017-08-05

## 2017-07-26 RX ORDER — LORAZEPAM 2 MG/ML
0.5 INJECTION INTRAMUSCULAR
Status: CANCELLED
Start: 2017-08-09 | End: 2017-08-19

## 2017-07-26 RX ORDER — FAMOTIDINE 10 MG/ML
20 INJECTION, SOLUTION INTRAVENOUS ONCE
Status: COMPLETED | OUTPATIENT
Start: 2017-07-26 | End: 2017-07-26

## 2017-07-26 RX ORDER — LORAZEPAM 2 MG/ML
0.5 INJECTION INTRAMUSCULAR
Status: DISCONTINUED | OUTPATIENT
Start: 2017-07-26 | End: 2017-07-26 | Stop reason: HOSPADM

## 2017-07-26 RX ORDER — LORAZEPAM 2 MG/ML
0.5 INJECTION INTRAMUSCULAR
Status: CANCELLED
Start: 2017-08-02 | End: 2017-08-12

## 2017-07-26 RX ORDER — PALONOSETRON 0.05 MG/ML
0.25 INJECTION, SOLUTION INTRAVENOUS ONCE
Status: COMPLETED | OUTPATIENT
Start: 2017-07-26 | End: 2017-07-26

## 2017-07-26 RX ORDER — FAMOTIDINE 10 MG/ML
20 INJECTION, SOLUTION INTRAVENOUS ONCE
Status: CANCELLED | OUTPATIENT
Start: 2017-08-02

## 2017-07-26 RX ORDER — FAMOTIDINE 10 MG/ML
20 INJECTION, SOLUTION INTRAVENOUS ONCE
Status: CANCELLED | OUTPATIENT
Start: 2017-08-09

## 2017-07-26 RX ORDER — SODIUM CHLORIDE 9 MG/ML
500 INJECTION, SOLUTION INTRAVENOUS ONCE
Status: CANCELLED | OUTPATIENT
Start: 2017-07-26

## 2017-07-26 RX ORDER — PALONOSETRON 0.05 MG/ML
0.25 INJECTION, SOLUTION INTRAVENOUS ONCE
Status: CANCELLED | OUTPATIENT
Start: 2017-08-09

## 2017-07-26 RX ORDER — PALONOSETRON 0.05 MG/ML
0.25 INJECTION, SOLUTION INTRAVENOUS ONCE
Status: CANCELLED | OUTPATIENT
Start: 2017-08-02

## 2017-07-26 RX ORDER — SODIUM CHLORIDE 9 MG/ML
500 INJECTION, SOLUTION INTRAVENOUS ONCE
Status: COMPLETED | OUTPATIENT
Start: 2017-07-26 | End: 2017-07-26

## 2017-07-26 RX ADMIN — LORAZEPAM 0.5 MG: 2 INJECTION INTRAMUSCULAR; INTRAVENOUS at 12:23

## 2017-07-26 RX ADMIN — SODIUM CHLORIDE 500 ML: 9 INJECTION, SOLUTION INTRAVENOUS at 12:13

## 2017-07-26 RX ADMIN — DEXAMETHASONE SODIUM PHOSPHATE 12 MG: 4 INJECTION, SOLUTION INTRAMUSCULAR; INTRAVENOUS at 12:30

## 2017-07-26 RX ADMIN — CARBOPLATIN 140 MG: 10 INJECTION, SOLUTION INTRAVENOUS at 14:30

## 2017-07-26 RX ADMIN — PACLITAXEL 105 MG: 6 INJECTION, SOLUTION, CONCENTRATE INTRAVENOUS at 13:21

## 2017-07-26 RX ADMIN — PALONOSETRON HYDROCHLORIDE 0.25 MG: 0.25 INJECTION INTRAVENOUS at 12:28

## 2017-07-26 RX ADMIN — DIPHENHYDRAMINE HYDROCHLORIDE 50 MG: 50 INJECTION INTRAMUSCULAR; INTRAVENOUS at 12:30

## 2017-07-26 RX ADMIN — FAMOTIDINE 20 MG: 10 INJECTION, SOLUTION INTRAVENOUS at 12:26

## 2017-07-26 RX ADMIN — SODIUM CHLORIDE 500 ML: 9 INJECTION, SOLUTION INTRAVENOUS at 15:07

## 2017-07-26 NOTE — PLAN OF CARE
Outpatient Infusion • [address] [facility] • [address] • [place] [id number] • [phone]      CHEMOTHERAPY EDUCATION SHEET    NAME:  Varsha Linares      : 1935           DATE: 17    Booklets Given: Chemotherapy and You []  Eating Hints []    Sexuality/Fertility Books []     Chemotherapy/Biotherapy Education Sheets: (list all that apply)  Carboplatin   Paclitaxel                                                                                                                                                                Chemotherapy Regimen:  Carboplatin + Paclitaxel weekly     TOPICS EDUCATION PROVIDED EDUCATION REINFORCED COMMENTS   ANEMIA:  role of RBC, cause, s/s, ways to manage, role of transfusion [x] [] Discussed fatigue and importance of resting and conserving energy.   THROMBOCYTOPENIA:  role of platelet, cause, s/s, ways to prevent bleeding, things to avoid, when to seek help [] []    NEUTROPENIA:  role of WBC, cause, infection precautions, s/s of infection, when to call MD [] []    NUTRITION & APPETITE CHANGES:  importance of maintaining healthy diet & weight, ways to manage to improve intake, dietary consult, exercise regimen [x] [] Discussed importance of nutrition and hydration, patient says she has been adamant about staying hydrated and drinking water.    DIARRHEA:  causes, s/s of dehydration, ways to manage, dietary changes, when to call MD [x] [] Discussed possibility of diarrhea and use of Imodium, and when to call MD if diarrhea doesn't resolve.   CONSTIPATION:  causes, ways to manage, dietary changes, when to call MD [x] [] Discussed possibility of constipation and use of Miralax if no bowel movement in 3 days, and when to call MD.   NAUSEA & VOMITING:  cause, use of antiemetics, dietary changes, when to call MD [x] [] Discussed possibility of N/V, and when to take Zofran at the first sign of nausea.   MOUTH SORES:  causes, oral care, ways to manage [x] [] Reinforced  preventative rinse for mouth sores with baking soda and salt - discussed to call MD if mouth sores occur.   ALOPECIA:  cause, ways to manage, resources [] []    INFERTILITY & SEXUALITY:  causes, fertility preservation options, sexuality changes, ways to manage, importance of birth control [x] [] Discussed safe sex practices.   NERVOUS SYSTEM CHANGES:  causes, s/s, neuropathies, cognitive changes, ways to manage [] []    PAIN:  causes, ways to manage [] [] ????   SKIN & NAIL CHANGES:  cause, s/s, ways to manage [x] [] Discussed infusion reactions and pre-medications to decrease risk of this.   ORGAN TOXICITIES:  cause, s/s, need for diagnostic tests, labs, when to notify MD [x] [] Discussed renal toxicity and importance of hydration with this chemotherapy regimen.   SURVIVORSHIP:  distress, distress assessment, secondary malignancies, early/late effects, follow-up, social issues, social support [x] [] Patient discussed anxiety and fear, she told me she spoke with a  and will be further pursuing therapy. Patient's son is a good support system.   HOME CARE:  use of spill kits, storing of PO chemo, how to manage bodily fluids [] []    MISCELLANEOUS:  drug interactions, administration, vesicant, et [x] [] Explained pre-medications for this chemotherapy regimen and preventing adverse reactions with these.     Notes:  Patient and her son were very engaging and asked appropriate questions. They know to call the clinic or [facility] if there are questions or concerns.    Nai Carlson, PharmD  7/26/2017  11:52 AM

## 2017-07-26 NOTE — PROGRESS NOTES
SW met with pt and her son during her first infusion to provide support and resources and to address distress screening score of 8.  Pt lives alone in [place] and states that her primary source of support is her son, who has accompanied her today.  Pt states that her treatment has gone very well today and much better than she expected.  Pt reports being very nervous about her first treatment.  Pt is concerned about her medical bills as well.  SW had already referred her to financial counseling to be evaluated for the patient assistance program.  SW provided information about resources and support services that are available at [facility].  SW provided support to pt and encouraged her to call with any future needs or concerns.  SW available for ongoing support and resource needs.

## 2017-07-26 NOTE — PROGRESS NOTES
Varsha Linares  [id number]  1935    Subjective     Reason for visit:  Pre-chemotherapy clearance and reassessment of weakness    History of present illness:  The patient is a 81 y.o. 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  She recently underwent minimally invasive staging surgery.  She has opted for adjuvant therapy and presents today prior to her first treatment with weekly carboplatin, weekly paclitaxel.    Today the patient notes that she she has since returned to her baseline from surgery.  She is more active each day although still complains of weakness.  She is been able to do laundry, good the store, and go up and down stairs.  She had been feeling weak for several months leading up to surgery especially with the bleeding she had had.  Otherwise no complaints.  Is understandably anxious today given that this will be her first infusion.  As mentioned in prior notes her daughter suffered from metastatic breast cancer and the patient is very nervous regarding potential toxicities from chemotherapy as she witnessed what her daughter went through.  Denies abdominal pain or bloating.  No nausea or vomiting.  Normal bowel and bladder function.  No fevers or chills.    Treatment History:    1.  EMB with Dr. Manning showed showed endometrial adenocarcinoma with papillary features and foci of high-grade histology.  Comments stated overall grade 2 with foci of grade 3.    2.  Pre-operative  was 29.  Preoperative CT of the abdomen/pelvis showed an 8 mm node anterior to the left common iliac vein or the proximal left external iliac vein which is a singular lymph node and is  noteworthy for possible exploration during the surgical resection of the uterus.  3.  On 6/30/17 she underwent a robotic-assisted total laparoscopic hysterectomy, bilateral salpingo-oophorectomy, and bilateral pelvic lymph node dissection.  Her surgery was challenging given the extent of her  diverticular disease and adhesions to the left adnexa as well as her significant visceral adiposity.   4.  Final pathology returned showing high-grade papillary serous adenocarcinoma involving the cervix, endocervix, and tumor extending into the left parametrium.  Tumor size was 3.5 cm.  There was full-thickness myometrial invasion.  +LVSI.  There were 0/4 right pelvic lymph nodes and 0/4 left pelvic lymph nodes.  Washings were negative.  5.  She was counseled regarding options for adjuvant therapy including sandwich.  She has opted to proceed with chemotherapy alone.    OBGYN History: NSVDx2.  No history of abnormal paps.  Prior RSO via Pfannenstiel for ovarian cysts.    Past Medical History:   Diagnosis Date   • Anxiety    • Arthritis    • Back pain    • BMI 37.0-37.9, adult    • Dyslipidemia    • Endometrial cancer    • GERD (gastroesophageal reflux disease)    • Herniated disc, cervical     and lower back    • HTN (hypertension)    • Hypothyroidism    • Spinal stenosis    • Spinal stenosis     lumbar    • Wears glasses    • Wears partial dentures     upper       Past Surgical History:   Procedure Laterality Date   • APPENDECTOMY OPEN     • BACK SURGERY      x2 lumbar      • BREAST BIOPSY Right     x2, benign disease   • CATARACT EXTRACTION      bilat with lens   2008?   • CHOLECYSTECTOMY OPEN  1987   • COLONOSCOPY     • RIGHT OOPHORECTOMY  1969    Pfannenstiel.  Ovarian cysts.   • TONSILLECTOMY      unsure of adnoids    • TOTAL KNEE ARTHROPLASTY Left 2007   • TOTAL LAPAROSCOPIC HYSTERECTOMY N/A 6/30/2017    Staging for enometrial cancer   • WISDOM TOOTH EXTRACTION         MEDICATIONS: The current medication list was reviewed with the patient and updated in the EMR this date per the Medical Assistant. Medication dosages and frequencies were confirmed to be accurate.      Allergies:  has No Known Allergies.    Social History: She is .  She lives 45 min away in [place].  She lives by herself and does not  work.  She denies use of tobacco, alcohol, and illicit drugs.     Family History:  Denies a history of breast, colon, endometrial, ovarian, and prostate cancer.  No history of melanoma.    Health Maintenance:    1. Mammogram - 12/2015  2. Colonoscopy - 2002    Review of Systems:  CONSTITUTIONAL:  Weight has been stable.  +fatigue.  No fever.  No chills, night sweats.  PSYCHIATRIC: +anxiety.  No history of depression.  No bipolar disorder or insomnia.  EYES: Vision is unchanged.  No photophobia.  EARS, NOSE, MOUTH, AND THROAT: Hearing normal. No swallowing difficulties.  No sore throat.  ENDOCRINE: No history of diabetes.  +thyroid disease.  LYMPHATIC: No enlarged lymph nodes  RESPIRATORY: No shortness of breath, cough, asthma or wheezing.  No hemoptysis.  CARDIOVASCULAR: No angina, orthopnea.  No edema.  +HTN.  +hyperlipidemia.  GASTROINTESTINAL: No constipation.  No diarrhea.  No melena.  +reflux.  No nausea.  No vomiting.  GENITOURINARY: No dysuria, hematuria, urgency, or frequency.  NEUROLOGIC: No numbness, weakness, motor disturbance, syncope, seizure, or headaches.  MUSCULOSKELETAL: +joint pain.  No muscle weakness.  INTEGUMENTARY: No new skin lesions.  GYNECOLOGIC: Per history of present illness.  HEMATOLOGIC: No bleeding problems.  Denies easy bruising.    Objective      Physical Exam  Vitals:    07/26/17 0944   BP: 179/86   Pulse: 65   Resp: 20   Temp: 96.8 °F (36 °C)   SpO2: 96%     Body mass index is 37.08 kg/(m^2).    Wt Readings from Last 3 Encounters:   07/26/17 216 lb (98 kg)   07/10/17 223 lb (101 kg)   06/30/17 218 lb (98.9 kg)     ECOG PS 0    GENERAL: Alert, well-appearing elderly female in no apparent distress.  She appears her stated age.  She is here with her son.  HEENT: Sclera anicteric, normal eyelids. Head normocephalic, atraumatic. Mucus membranes moist.  Normal dentition.    NECK: Trachea midline, supple, without masses.  No thyromegaly.      CARDIOVASCULAR: Normal rate, regular rhythm, no  murmurs, rubs, or gallops.  Extremities symmetric.  No peripheral edema.  RESPIRATORY: Clear to auscultation bilaterally, normal respiratory effort  GASTROINTESTINAL:  Soft, obese, non-tender, non-distended, no rebound or guarding, no masses, or hernias.  No HSM.  Incisions - her recent laparoscopic incisions are healing.  There is less bruising at her pannus.  MUSCULOSKELETAL:  Normal gait and station.  FROMx4.  No digital cyanosis.    SKIN:  Warm, dry, well-perfused.  All visible areas intact.  No rashes, lesions, ulcers.  PSYCHIATRIC: AO x3, with appropriate affect, normal thought processes  LYMPHATICS:  No cervical or inguinal adenopathy noted.  PELVIC: Normal external female genitalia without lesions or skin changes.  Urethra midline.  Vagina without lesions.  Cuff intact, healed, and without visible lesions.  On bimanual exam vagina and cuff are smooth and without nodularity.  No palpable abdominal masses. Rectovaginal exam deferred.      Diagnostic Data:    Post-op CT Chest 7/18/17  FINDINGS: The patient history indicates stage III uterine cancer,  evaluation for metastatic disease. No given history of active chest  symptoms.  There is good contrast opacification of the pulmonary arteries on this  exam sufficient to exclude any large pulmonary emboli. No mediastinal  adenopathy, pericardial or pleural effusion is seen. There is no  evidence of axillary adenopathy.  Lung window images show trace linear atelectasis in the right posterior  costophrenic recess, but no evidence of active pulmonary parenchymal  disease. A few minute granulomatous calcifications are noted. The bony  structures appear intact.  Included images of the upper abdomen show no significant abnormalities  of the visualized portions of the liver, spleen, pancreatic tail,  adrenal glands, or upper renal poles. The gallbladder is surgically  absent.  IMPRESSION:  No evidence of metastatic disease or other active disease in  the chest.    Lab  Results   Component Value Date    WBC 10.30 2017    HGB 13.4 2017    HCT 41.1 2017    MCV 91.4 2017     2017    NEUTROABS 8.00 2017    GLUCOSE 92 2017    BUN 12 2017    CREATININE 1.10 2017     2017    K 4.1 2017     2017    CO2 24.0 2017    CALCIUM 9.1 2017    ALBUMIN 4.20 2017    AST 24 2017    ALT 20 2017    BILITOT 0.6 2017     29.0 2017         Assessment/Plan  This is a 81 y.o. 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    1.  Endometrial cancer:    -She has opted for adjuvant therapy with chemo alone.  With her elderly state, plan will be to begin weekly chemotherapy and she how she tolerates it.  If she does well, will aim for 6 cycles.  A minimum of 3 may provided therapeutic benefit.  -She declined port a cath placement  -Is cleared to proceed with C1D1 of cytotoxic therapy today  -Labs reviewed and are suitable for treatment    2.  Customization of care plan:    -She is very emotional regarding cancer and cancer treatment in general as her daughter  of stage IV breast cancer.  The patient assisted through the majority of her chemotherapy and radiation and felt her daughter had many side effects and suffered.    -Per above, plan will be for chemotherapy alone    3.  Anxiety:  -Continue home Xanax  -Ativan prn on infusion days    4.  Medical comorbidities:    -She is almost 82 years old and this has been, and will continue to be. taken into account during treatment planning.  ECOG PS is 0.  -I think she would benefit from referral to physical therapy.  Home health PT may also be an option.  I placed this referral today and the  will coordinate.  The goal will be to provide assistance with generalized deconditioning.  She has no activity restrictions from a  postoperative perspective.    5.  Postoperative:  -The primary reason for this visit was unrelated to the postoperative care of this patient.    -Pelvic exam was performed today and she has healed well    6.  Follow up:   -RTC in 3 weeks prior to cycle 2        Electronically Signed by: Pretty Ngo MD  Date: 7/26/2017      Time:  11:41 AM    Note: Speech recognition transcription software was used to dictate portions of this document.  An attempt at proofreading has been made though minor errors in transcription may still be present.  Please do not hesitate to call our office with any questions.

## 2017-07-26 NOTE — PROGRESS NOTES
Oncology Nutrition Screening    Patient Name:  Varsha Linares  YOB: 1935  MRN: [id number]  Date:  07/26/17  Physician:  Dr. Pretty Ngo     Type of Cancer Treatment:   6 Cycles Carbo / Taxol - 21 day cycle    Patient Active Problem List   Diagnosis   • BMI 37.0-37.9, adult   • Endometrial cancer   • Secondary malignant neoplasm of cervix   • Secondary malignant neoplasm of parametrium   • On antineoplastic chemotherapy       Current Outpatient Prescriptions   Medication Sig Dispense Refill   • ALPRAZolam (XANAX) 0.5 MG tablet Take 0.5 mg by mouth 4 (Four) Times a Day As Needed for Anxiety.     • atenolol (TENORMIN) 25 MG tablet Take 50 mg by mouth Daily.     • benazepril (LOTENSIN) 20 MG tablet Take 20 mg by mouth Daily.     • citalopram (CeleXA) 20 MG tablet Take 20 mg by mouth Daily.     • dexamethasone (DECADRON) 4 MG tablet Take 5 tabs the night before chemo. 45 tablet 1   • dicyclomine (BENTYL) 10 MG capsule Take 10 mg by mouth 3 (Three) Times a Day As Needed (gerd).     • hydrochlorothiazide (HYDRODIURIL) 12.5 MG tablet Take 12.5 mg by mouth Daily.     • levothyroxine (SYNTHROID, LEVOTHROID) 100 MCG tablet Take 100 mcg by mouth Daily.     • loratadine (CLARITIN) 10 MG tablet Take 1 tab the night before chemo and 1 tab the morning of chemo. 40 tablet 1   • Multiple Vitamins-Minerals (OCUVITE EXTRA PO) Take 1 capsule by mouth Daily.     • ondansetron (ZOFRAN) 4 MG tablet Take 1 tablet by mouth Every 6 (Six) Hours As Needed for Nausea or Vomiting. 15 tablet 0   • ondansetron (ZOFRAN) 8 MG tablet Take 1 tablet by mouth 3 (Three) Times a Day As Needed for Nausea or Vomiting. 30 tablet 5   • oxyCODONE (ROXICODONE) 5 MG immediate release tablet Take 0.5-1 tablets by mouth Every 4 (Four) Hours As Needed for Moderate Pain (4-6). 25 tablet 0   • pravastatin (PRAVACHOL) 40 MG tablet Take 40 mg by mouth Daily.     • prochlorperazine (COMPAZINE) 10 MG tablet Take 1 tablet by mouth Every 6 (Six)  Hours As Needed for Nausea or Vomiting. 30 tablet 5   • raNITIdine (ZANTAC) 150 MG tablet Take 150 mg by mouth 2 (Two) Times a Day.       No current facility-administered medications for this visit.        Glycemic Risk:   Tisha    Weight:   Height: 64 inches  Weight: 216 lbs.  Usual Body Weight: 220 lbs.   BMI: 37  Extremely Obese  Weight has been stable    Oral Food Intake:  Regular Diet - No Restrictions    Hydration Status:   How many 8 ounce glass of water of fluid do you drink per day?  To be assessed at time of consult    Enteral Feeding:   NA    Nutrition Symptoms:   Nausea  Fatigue    Activity:   Not my normal self, but able to be up and about with fairly normal activities     reports that she has never smoked. She has never used smokeless tobacco. She reports that she does not drink alcohol or use illicit drugs.    Evaluation of Nutritional Risk:   Patient is identified at risk and for patient nutrition education related to diagnosis, surgery and new chemotherapy treatment plan.    Consulted with patient and her son who accompanies her to treatment today.  Patient states that she is still slowly recovering from surgery, often has bouts with nausea and fatigue.  She lives alone and is responsible for her own food preparation.  She has not been doing much food preparation since surgery; has eaten mainly peanut butter, crackers and eggs since surgery; neighbors will occasionally bring in food.    Discussed possible nutritional side effects of chemotherapy and tips for management; provided suggestions for types of foods that would be easy preparation and more appealing with diminished appetite; discussed importance of nutrition with emphasis on higher protein.  Patient with long standing history of GERD; tips and written information provided.  Exercise encouraged.Questions addressed.  Will follow as needed.        Electronically signed by:  Carla Lopez RD  11:11 AM

## 2017-07-28 ENCOUNTER — TELEPHONE (OUTPATIENT)
Dept: GYNECOLOGIC ONCOLOGY | Facility: CLINIC | Age: 82
End: 2017-07-28

## 2017-07-28 LAB — BACTERIA SPEC AEROBE CULT: NORMAL

## 2017-07-28 NOTE — TELEPHONE ENCOUNTER
I called to check on pt post first carbo / taxol treatment 2 days ago. She woke up with nausea this morning and took Zofran.  She has since had a phenergan and she no longer has any nausea.  She had no other complaints.  We discussed nausea medications again.  She verbalized understanding and will call our office with any questions or concerns.

## 2017-07-31 ENCOUNTER — TELEPHONE (OUTPATIENT)
Dept: GYNECOLOGIC ONCOLOGY | Facility: CLINIC | Age: 82
End: 2017-07-31

## 2017-07-31 DIAGNOSIS — C54.1 ENDOMETRIAL CANCER (HCC): ICD-10-CM

## 2017-07-31 DIAGNOSIS — Z79.899 ON ANTINEOPLASTIC CHEMOTHERAPY: ICD-10-CM

## 2017-07-31 RX ORDER — SODIUM CHLORIDE 9 MG/ML
500 INJECTION, SOLUTION INTRAVENOUS ONCE
Status: CANCELLED | OUTPATIENT
Start: 2017-08-16

## 2017-07-31 RX ORDER — PALONOSETRON 0.05 MG/ML
0.25 INJECTION, SOLUTION INTRAVENOUS ONCE
Status: CANCELLED | OUTPATIENT
Start: 2017-08-16

## 2017-07-31 RX ORDER — LORAZEPAM 2 MG/ML
0.5 INJECTION INTRAMUSCULAR
Status: CANCELLED
Start: 2017-08-16 | End: 2017-08-26

## 2017-07-31 RX ORDER — FAMOTIDINE 10 MG/ML
20 INJECTION, SOLUTION INTRAVENOUS ONCE
Status: CANCELLED | OUTPATIENT
Start: 2017-08-16

## 2017-07-31 NOTE — TELEPHONE ENCOUNTER
Pt called and asked if she could change her chemo schedule from weekly to every other week and double the dose.  I told her this regimen can be given once every 3 weeks but that it was felt she would tolerate weekly dosing the best.  I asked how she has felt since her last chemo tx.  She said she felt very bad the past 2 days but is feeling better today.  She has been having to take scheduled anti-emetics for nausea control.  We discussed that she most likely could not tolerate her chemo drugs if they were dosed higher than they are now.  At the end of our conversation she agreed that she feels her current weekly schedule of chemotherapy is probably the best for her.  She will call back with any further questions or concerns.

## 2017-08-02 ENCOUNTER — INFUSION (OUTPATIENT)
Dept: ONCOLOGY | Facility: HOSPITAL | Age: 82
End: 2017-08-02

## 2017-08-02 VITALS
WEIGHT: 215 LBS | TEMPERATURE: 97.3 F | HEIGHT: 64 IN | DIASTOLIC BLOOD PRESSURE: 57 MMHG | HEART RATE: 62 BPM | SYSTOLIC BLOOD PRESSURE: 130 MMHG | BODY MASS INDEX: 36.7 KG/M2 | RESPIRATION RATE: 16 BRPM

## 2017-08-02 DIAGNOSIS — Z79.899 ON ANTINEOPLASTIC CHEMOTHERAPY: ICD-10-CM

## 2017-08-02 DIAGNOSIS — C54.1 ENDOMETRIAL CANCER (HCC): Primary | ICD-10-CM

## 2017-08-02 LAB
ALBUMIN SERPL-MCNC: 4.2 G/DL (ref 3.2–4.8)
ALBUMIN/GLOB SERPL: 1.5 G/DL (ref 1.5–2.5)
ALP SERPL-CCNC: 66 U/L (ref 25–100)
ALT SERPL W P-5'-P-CCNC: 23 U/L (ref 7–40)
ANION GAP SERPL CALCULATED.3IONS-SCNC: 10 MMOL/L (ref 3–11)
AST SERPL-CCNC: 19 U/L (ref 0–33)
BILIRUB SERPL-MCNC: 0.7 MG/DL (ref 0.3–1.2)
BUN BLD-MCNC: 18 MG/DL (ref 9–23)
BUN/CREAT SERPL: 16.4 (ref 7–25)
CALCIUM SPEC-SCNC: 9.7 MG/DL (ref 8.7–10.4)
CHLORIDE SERPL-SCNC: 101 MMOL/L (ref 99–109)
CO2 SERPL-SCNC: 24 MMOL/L (ref 20–31)
CREAT BLD-MCNC: 1.1 MG/DL (ref 0.6–1.3)
CREAT BLDA-MCNC: 1.2 MG/DL (ref 0.6–1.3)
ERYTHROCYTE [DISTWIDTH] IN BLOOD BY AUTOMATED COUNT: 12.7 % (ref 11.3–14.5)
GFR SERPL CREATININE-BSD FRML MDRD: 48 ML/MIN/1.73
GLOBULIN UR ELPH-MCNC: 2.8 GM/DL
GLUCOSE BLD-MCNC: 111 MG/DL (ref 70–100)
HCT VFR BLD AUTO: 39.3 % (ref 34.5–44)
HGB BLD-MCNC: 12.9 G/DL (ref 11.5–15.5)
LYMPHOCYTES # BLD AUTO: 1.8 10*3/MM3 (ref 0.6–4.8)
LYMPHOCYTES NFR BLD AUTO: 17.1 % (ref 24–44)
MCH RBC QN AUTO: 29.7 PG (ref 27–31)
MCHC RBC AUTO-ENTMCNC: 32.9 G/DL (ref 32–36)
MCV RBC AUTO: 90.3 FL (ref 80–99)
MONOCYTES # BLD AUTO: 0.1 10*3/MM3 (ref 0–1)
MONOCYTES NFR BLD AUTO: 0.8 % (ref 0–12)
NEUTROPHILS # BLD AUTO: 8.7 10*3/MM3 (ref 1.5–8.3)
NEUTROPHILS NFR BLD AUTO: 82.1 % (ref 41–71)
PLATELET # BLD AUTO: 240 10*3/MM3 (ref 150–450)
PMV BLD AUTO: 7.6 FL (ref 6–12)
POTASSIUM BLD-SCNC: 4 MMOL/L (ref 3.5–5.5)
PROT SERPL-MCNC: 7 G/DL (ref 5.7–8.2)
RBC # BLD AUTO: 4.35 10*6/MM3 (ref 3.89–5.14)
SODIUM BLD-SCNC: 135 MMOL/L (ref 132–146)
WBC NRBC COR # BLD: 10.6 10*3/MM3 (ref 3.5–10.8)

## 2017-08-02 PROCEDURE — 82565 ASSAY OF CREATININE: CPT

## 2017-08-02 PROCEDURE — 96415 CHEMO IV INFUSION ADDL HR: CPT

## 2017-08-02 PROCEDURE — 25010000002 PACLITAXEL PER 30 MG: Performed by: OBSTETRICS & GYNECOLOGY

## 2017-08-02 PROCEDURE — 25010000002 PALONOSETRON PER 25 MCG: Performed by: OBSTETRICS & GYNECOLOGY

## 2017-08-02 PROCEDURE — 25010000002 DIPHENHYDRAMINE PER 50 MG: Performed by: OBSTETRICS & GYNECOLOGY

## 2017-08-02 PROCEDURE — 96361 HYDRATE IV INFUSION ADD-ON: CPT

## 2017-08-02 PROCEDURE — 96413 CHEMO IV INFUSION 1 HR: CPT

## 2017-08-02 PROCEDURE — 96417 CHEMO IV INFUS EACH ADDL SEQ: CPT

## 2017-08-02 PROCEDURE — 85025 COMPLETE CBC W/AUTO DIFF WBC: CPT

## 2017-08-02 PROCEDURE — 25010000002 DEXAMETHASONE PER 1 MG: Performed by: OBSTETRICS & GYNECOLOGY

## 2017-08-02 PROCEDURE — 96375 TX/PRO/DX INJ NEW DRUG ADDON: CPT

## 2017-08-02 PROCEDURE — 25010000002 CARBOPLATIN PER 50 MG: Performed by: OBSTETRICS & GYNECOLOGY

## 2017-08-02 PROCEDURE — 80053 COMPREHEN METABOLIC PANEL: CPT

## 2017-08-02 RX ORDER — PALONOSETRON 0.05 MG/ML
0.25 INJECTION, SOLUTION INTRAVENOUS ONCE
Status: COMPLETED | OUTPATIENT
Start: 2017-08-02 | End: 2017-08-02

## 2017-08-02 RX ORDER — FAMOTIDINE 10 MG/ML
20 INJECTION, SOLUTION INTRAVENOUS ONCE
Status: COMPLETED | OUTPATIENT
Start: 2017-08-02 | End: 2017-08-02

## 2017-08-02 RX ORDER — LORAZEPAM 2 MG/ML
0.5 INJECTION INTRAMUSCULAR
Status: DISCONTINUED | OUTPATIENT
Start: 2017-08-02 | End: 2017-08-02 | Stop reason: HOSPADM

## 2017-08-02 RX ORDER — SODIUM CHLORIDE 9 MG/ML
500 INJECTION, SOLUTION INTRAVENOUS ONCE
Status: COMPLETED | OUTPATIENT
Start: 2017-08-02 | End: 2017-08-02

## 2017-08-02 RX ADMIN — SODIUM CHLORIDE 500 ML: 9 INJECTION, SOLUTION INTRAVENOUS at 14:00

## 2017-08-02 RX ADMIN — PACLITAXEL 105 MG: 6 INJECTION, SOLUTION, CONCENTRATE INTRAVENOUS at 13:55

## 2017-08-02 RX ADMIN — PALONOSETRON HYDROCHLORIDE 0.25 MG: 0.25 INJECTION INTRAVENOUS at 13:35

## 2017-08-02 RX ADMIN — CARBOPLATIN 140 MG: 10 INJECTION, SOLUTION INTRAVENOUS at 15:02

## 2017-08-02 RX ADMIN — FAMOTIDINE 20 MG: 10 INJECTION, SOLUTION INTRAVENOUS at 13:28

## 2017-08-02 RX ADMIN — SODIUM CHLORIDE 500 ML: 9 INJECTION, SOLUTION INTRAVENOUS at 15:33

## 2017-08-02 RX ADMIN — DEXAMETHASONE SODIUM PHOSPHATE 12 MG: 4 INJECTION, SOLUTION INTRAMUSCULAR; INTRAVENOUS at 13:36

## 2017-08-02 RX ADMIN — DIPHENHYDRAMINE HYDROCHLORIDE 25 MG: 50 INJECTION INTRAMUSCULAR; INTRAVENOUS at 13:37

## 2017-08-07 ENCOUNTER — TELEPHONE (OUTPATIENT)
Dept: GYNECOLOGIC ONCOLOGY | Facility: CLINIC | Age: 82
End: 2017-08-07

## 2017-08-07 NOTE — TELEPHONE ENCOUNTER
Pt called with complaints of abdominal discomfort for the past 2 - 3 days.  Today she has noticed a lot of grumbling sounds from her abdomen.  She had one episode of emesis on 08/05/17 after eating vegetable soup.  She says that nausea is tolerable after taking anti-emetics.  Her appetite is slightly decreased but she is still eating well.  She are beef and rice, peanut butter with crackers and jello yesterday.  She has not had a bowel movement today but has been having daily bowel movements with yesterday's BM being a looser than normal but not diarrhea.  She has not had any fevers.  I advised she try taking an adult dose of gas-x as she may be having gas pain and to call me around 1:00 pm today with an update.  She agreed and verbalized understanding.

## 2017-08-09 ENCOUNTER — INFUSION (OUTPATIENT)
Dept: ONCOLOGY | Facility: HOSPITAL | Age: 82
End: 2017-08-09

## 2017-08-09 VITALS
HEIGHT: 64 IN | HEART RATE: 62 BPM | RESPIRATION RATE: 18 BRPM | WEIGHT: 211 LBS | TEMPERATURE: 97.9 F | SYSTOLIC BLOOD PRESSURE: 152 MMHG | BODY MASS INDEX: 36.02 KG/M2 | DIASTOLIC BLOOD PRESSURE: 73 MMHG

## 2017-08-09 DIAGNOSIS — C54.1 ENDOMETRIAL CANCER (HCC): ICD-10-CM

## 2017-08-09 DIAGNOSIS — Z79.899 ON ANTINEOPLASTIC CHEMOTHERAPY: ICD-10-CM

## 2017-08-09 DIAGNOSIS — C54.1 ENDOMETRIAL CANCER (HCC): Primary | ICD-10-CM

## 2017-08-09 DIAGNOSIS — C79.82: ICD-10-CM

## 2017-08-09 LAB
ALBUMIN SERPL-MCNC: 3.9 G/DL (ref 3.2–4.8)
ALBUMIN/GLOB SERPL: 1.8 G/DL (ref 1.5–2.5)
ALP SERPL-CCNC: 104 U/L (ref 25–100)
ALT SERPL W P-5'-P-CCNC: 146 U/L (ref 7–40)
ANION GAP SERPL CALCULATED.3IONS-SCNC: 9 MMOL/L (ref 3–11)
AST SERPL-CCNC: 95 U/L (ref 0–33)
BACTERIA UR QL AUTO: ABNORMAL /HPF
BILIRUB SERPL-MCNC: 0.7 MG/DL (ref 0.3–1.2)
BILIRUB UR QL STRIP: NEGATIVE
BUN BLD-MCNC: 26 MG/DL (ref 9–23)
BUN/CREAT SERPL: 20 (ref 7–25)
CALCIUM SPEC-SCNC: 8.7 MG/DL (ref 8.7–10.4)
CHLORIDE SERPL-SCNC: 93 MMOL/L (ref 99–109)
CLARITY UR: CLEAR
CO2 SERPL-SCNC: 24 MMOL/L (ref 20–31)
COLOR UR: YELLOW
CREAT BLD-MCNC: 1.3 MG/DL (ref 0.6–1.3)
CREAT BLDA-MCNC: 1.4 MG/DL (ref 0.6–1.3)
CREATINE SERPL-MCNC: 1.4 MG/DL
ERYTHROCYTE [DISTWIDTH] IN BLOOD BY AUTOMATED COUNT: 12.5 % (ref 11.3–14.5)
GFR SERPL CREATININE-BSD FRML MDRD: 40 ML/MIN/1.73
GLOBULIN UR ELPH-MCNC: 2.2 GM/DL
GLUCOSE BLD-MCNC: 187 MG/DL (ref 70–100)
GLUCOSE UR STRIP-MCNC: NEGATIVE MG/DL
HCT VFR BLD AUTO: 35.7 % (ref 34.5–44)
HGB BLD-MCNC: 11.7 G/DL (ref 11.5–15.5)
HGB UR QL STRIP.AUTO: NEGATIVE
HYALINE CASTS UR QL AUTO: ABNORMAL /LPF
KETONES UR QL STRIP: NEGATIVE
LEUKOCYTE ESTERASE UR QL STRIP.AUTO: NEGATIVE
LYMPHOCYTES # BLD AUTO: 1.1 10*3/MM3 (ref 0.6–4.8)
LYMPHOCYTES NFR BLD AUTO: 26.3 % (ref 24–44)
MCH RBC QN AUTO: 29.8 PG (ref 27–31)
MCHC RBC AUTO-ENTMCNC: 32.9 G/DL (ref 32–36)
MCV RBC AUTO: 90.5 FL (ref 80–99)
MONOCYTES # BLD AUTO: 0.1 10*3/MM3 (ref 0–1)
MONOCYTES NFR BLD AUTO: 1.9 % (ref 0–12)
NEUTROPHILS # BLD AUTO: 3.1 10*3/MM3 (ref 1.5–8.3)
NEUTROPHILS NFR BLD AUTO: 71.8 % (ref 41–71)
NITRITE UR QL STRIP: NEGATIVE
PH UR STRIP.AUTO: 6 [PH] (ref 5–8)
PLATELET # BLD AUTO: 262 10*3/MM3 (ref 150–450)
PMV BLD AUTO: 7.4 FL (ref 6–12)
POTASSIUM BLD-SCNC: 3.9 MMOL/L (ref 3.5–5.5)
PROT SERPL-MCNC: 6.1 G/DL (ref 5.7–8.2)
PROT UR QL STRIP: NEGATIVE
RBC # BLD AUTO: 3.94 10*6/MM3 (ref 3.89–5.14)
RBC # UR: ABNORMAL /HPF
REF LAB TEST METHOD: ABNORMAL
SODIUM BLD-SCNC: 126 MMOL/L (ref 132–146)
SP GR UR STRIP: 1.01 (ref 1–1.03)
SQUAMOUS #/AREA URNS HPF: ABNORMAL /HPF
UROBILINOGEN UR QL STRIP: NORMAL
WBC NRBC COR # BLD: 4.3 10*3/MM3 (ref 3.5–10.8)
WBC UR QL AUTO: ABNORMAL /HPF

## 2017-08-09 PROCEDURE — 25010000002 DIPHENHYDRAMINE PER 50 MG: Performed by: OBSTETRICS & GYNECOLOGY

## 2017-08-09 PROCEDURE — 87147 CULTURE TYPE IMMUNOLOGIC: CPT

## 2017-08-09 PROCEDURE — 96360 HYDRATION IV INFUSION INIT: CPT

## 2017-08-09 PROCEDURE — 80053 COMPREHEN METABOLIC PANEL: CPT

## 2017-08-09 PROCEDURE — 25010000002 CARBOPLATIN PER 50 MG: Performed by: OBSTETRICS & GYNECOLOGY

## 2017-08-09 PROCEDURE — 87086 URINE CULTURE/COLONY COUNT: CPT

## 2017-08-09 PROCEDURE — 96417 CHEMO IV INFUS EACH ADDL SEQ: CPT

## 2017-08-09 PROCEDURE — 82565 ASSAY OF CREATININE: CPT

## 2017-08-09 PROCEDURE — 96375 TX/PRO/DX INJ NEW DRUG ADDON: CPT

## 2017-08-09 PROCEDURE — 25010000002 PALONOSETRON PER 25 MCG: Performed by: OBSTETRICS & GYNECOLOGY

## 2017-08-09 PROCEDURE — 25010000002 DEXAMETHASONE PER 1 MG: Performed by: OBSTETRICS & GYNECOLOGY

## 2017-08-09 PROCEDURE — 96413 CHEMO IV INFUSION 1 HR: CPT

## 2017-08-09 PROCEDURE — 36415 COLL VENOUS BLD VENIPUNCTURE: CPT

## 2017-08-09 PROCEDURE — 25010000002 PACLITAXEL PER 30 MG: Performed by: OBSTETRICS & GYNECOLOGY

## 2017-08-09 PROCEDURE — 81001 URINALYSIS AUTO W/SCOPE: CPT

## 2017-08-09 PROCEDURE — 85025 COMPLETE CBC W/AUTO DIFF WBC: CPT

## 2017-08-09 RX ORDER — FAMOTIDINE 10 MG/ML
20 INJECTION, SOLUTION INTRAVENOUS ONCE
Status: COMPLETED | OUTPATIENT
Start: 2017-08-09 | End: 2017-08-09

## 2017-08-09 RX ORDER — PALONOSETRON 0.05 MG/ML
0.25 INJECTION, SOLUTION INTRAVENOUS ONCE
Status: COMPLETED | OUTPATIENT
Start: 2017-08-09 | End: 2017-08-09

## 2017-08-09 RX ORDER — SODIUM CHLORIDE 9 MG/ML
500 INJECTION, SOLUTION INTRAVENOUS ONCE
Status: COMPLETED | OUTPATIENT
Start: 2017-08-09 | End: 2017-08-09

## 2017-08-09 RX ORDER — LORAZEPAM 2 MG/ML
0.5 INJECTION INTRAMUSCULAR
Status: DISCONTINUED | OUTPATIENT
Start: 2017-08-09 | End: 2017-08-09 | Stop reason: HOSPADM

## 2017-08-09 RX ADMIN — PALONOSETRON HYDROCHLORIDE 0.25 MG: 0.25 INJECTION INTRAVENOUS at 13:46

## 2017-08-09 RX ADMIN — CARBOPLATIN 120 MG: 10 INJECTION, SOLUTION INTRAVENOUS at 15:40

## 2017-08-09 RX ADMIN — FAMOTIDINE 20 MG: 10 INJECTION, SOLUTION INTRAVENOUS at 14:06

## 2017-08-09 RX ADMIN — DIPHENHYDRAMINE HYDROCHLORIDE 25 MG: 50 INJECTION INTRAMUSCULAR; INTRAVENOUS at 13:50

## 2017-08-09 RX ADMIN — PACLITAXEL 105 MG: 6 INJECTION, SOLUTION, CONCENTRATE INTRAVENOUS at 14:27

## 2017-08-09 RX ADMIN — DEXAMETHASONE SODIUM PHOSPHATE 12 MG: 4 INJECTION, SOLUTION INTRAMUSCULAR; INTRAVENOUS at 13:49

## 2017-08-09 RX ADMIN — SODIUM CHLORIDE 500 ML: 9 INJECTION, SOLUTION INTRAVENOUS at 13:47

## 2017-08-09 RX ADMIN — SODIUM CHLORIDE 500 ML: 9 INJECTION, SOLUTION INTRAVENOUS at 15:05

## 2017-08-09 NOTE — PROGRESS NOTES
"Ms. Linares reports she is drinking plenty of fluids, greater than 8- 8 oz /day but does not feel she is urinating much.  Denied frequency, urgency or dysuria.  Orders obtained for u/a c &s.      Also states that her hands \"cramped\" this morning.  Explained s/s of neuropathy but denies this type of symptoms.  "

## 2017-08-11 ENCOUNTER — TELEPHONE (OUTPATIENT)
Dept: GYNECOLOGIC ONCOLOGY | Facility: CLINIC | Age: 82
End: 2017-08-11

## 2017-08-11 NOTE — TELEPHONE ENCOUNTER
"I spoke with pt via phone.  She said she is \"feeling a lot better today\" than she usually does after chemo. She is having no nausea.  She said she has drank 8 ounces of apple juice, 1 popcicle, 1 cup of coffee and eight 8 ounce glasses of water.  She is determined to stay hydrated and now sees what a big difference it makes in how she feels. She will call our office back with any problems or concerns.   "

## 2017-08-12 LAB
BACTERIA SPEC AEROBE CULT: ABNORMAL
BACTERIA SPEC AEROBE CULT: ABNORMAL
STREP GROUPING: ABNORMAL

## 2017-08-14 ENCOUNTER — TELEPHONE (OUTPATIENT)
Dept: GYNECOLOGIC ONCOLOGY | Facility: CLINIC | Age: 82
End: 2017-08-14

## 2017-08-14 NOTE — TELEPHONE ENCOUNTER
"Pt called and said she wanted to let us know she will not be continuing with chemotherapy.  She said since the first treatment she has spent most of days in the bed due to fatigue and weakness.  She said, \"I'm not going to punish myself with more chemo\" and \"I wish I'd never taken the first one.\"  She said she has discussed this with her son Ru Lino and that it was her decision to stop.  She did not want to discuss any further and did not want to come in for visit to assess symptoms or for labs today.  I told her I will inform Dr. Ngo of her decision.  Pt said she does want to come in for visit to see her sometime soon.    "

## 2017-08-15 ENCOUNTER — TELEPHONE (OUTPATIENT)
Dept: GYNECOLOGIC ONCOLOGY | Facility: CLINIC | Age: 82
End: 2017-08-15

## 2017-08-15 DIAGNOSIS — E86.0 DEHYDRATION: ICD-10-CM

## 2017-08-15 DIAGNOSIS — C54.1 ENDOMETRIAL CANCER (HCC): ICD-10-CM

## 2017-08-15 RX ORDER — SODIUM CHLORIDE 9 MG/ML
999 INJECTION, SOLUTION INTRAVENOUS ONCE
Status: CANCELLED
Start: 2017-08-17 | End: 2017-08-17

## 2017-08-15 NOTE — TELEPHONE ENCOUNTER
I called pt's son and gave him date, time and location for IV fluids / labs, 08/17/17 at 3:00pm at outpatient infusion.  He was very polite and verbalized understanding.

## 2017-08-16 ENCOUNTER — APPOINTMENT (OUTPATIENT)
Dept: ONCOLOGY | Facility: HOSPITAL | Age: 82
End: 2017-08-16

## 2017-08-17 ENCOUNTER — INFUSION (OUTPATIENT)
Dept: ONCOLOGY | Facility: HOSPITAL | Age: 82
End: 2017-08-17

## 2017-08-17 ENCOUNTER — RESULTS ENCOUNTER (OUTPATIENT)
Dept: GYNECOLOGIC ONCOLOGY | Facility: CLINIC | Age: 82
End: 2017-08-17

## 2017-08-17 VITALS
HEIGHT: 64 IN | SYSTOLIC BLOOD PRESSURE: 127 MMHG | WEIGHT: 212 LBS | DIASTOLIC BLOOD PRESSURE: 70 MMHG | BODY MASS INDEX: 36.19 KG/M2 | TEMPERATURE: 97.4 F | RESPIRATION RATE: 18 BRPM | HEART RATE: 74 BPM

## 2017-08-17 DIAGNOSIS — C54.1 ENDOMETRIAL CANCER (HCC): Primary | ICD-10-CM

## 2017-08-17 DIAGNOSIS — C54.1 ENDOMETRIAL CANCER (HCC): ICD-10-CM

## 2017-08-17 DIAGNOSIS — E86.0 DEHYDRATION: ICD-10-CM

## 2017-08-17 LAB
ANION GAP SERPL CALCULATED.3IONS-SCNC: 8 MMOL/L (ref 3–11)
BUN BLD-MCNC: 10 MG/DL (ref 9–23)
BUN/CREAT SERPL: 9.1 (ref 7–25)
CALCIUM SPEC-SCNC: 9.2 MG/DL (ref 8.7–10.4)
CHLORIDE SERPL-SCNC: 101 MMOL/L (ref 99–109)
CO2 SERPL-SCNC: 25 MMOL/L (ref 20–31)
CREAT BLD-MCNC: 1.1 MG/DL (ref 0.6–1.3)
ERYTHROCYTE [DISTWIDTH] IN BLOOD BY AUTOMATED COUNT: 13.2 % (ref 11.3–14.5)
GFR SERPL CREATININE-BSD FRML MDRD: 48 ML/MIN/1.73
GLUCOSE BLD-MCNC: 91 MG/DL (ref 70–100)
HCT VFR BLD AUTO: 34.9 % (ref 34.5–44)
HGB BLD-MCNC: 11.3 G/DL (ref 11.5–15.5)
LYMPHOCYTES # BLD AUTO: 3.4 10*3/MM3 (ref 0.6–4.8)
LYMPHOCYTES NFR BLD AUTO: 46.8 % (ref 24–44)
MCH RBC QN AUTO: 29.6 PG (ref 27–31)
MCHC RBC AUTO-ENTMCNC: 32.4 G/DL (ref 32–36)
MCV RBC AUTO: 91.6 FL (ref 80–99)
MONOCYTES # BLD AUTO: 0.4 10*3/MM3 (ref 0–1)
MONOCYTES NFR BLD AUTO: 6.2 % (ref 0–12)
NEUTROPHILS # BLD AUTO: 3.4 10*3/MM3 (ref 1.5–8.3)
NEUTROPHILS NFR BLD AUTO: 47 % (ref 41–71)
PLATELET # BLD AUTO: 286 10*3/MM3 (ref 150–450)
PMV BLD AUTO: 6.7 FL (ref 6–12)
POTASSIUM BLD-SCNC: 4.2 MMOL/L (ref 3.5–5.5)
RBC # BLD AUTO: 3.81 10*6/MM3 (ref 3.89–5.14)
SODIUM BLD-SCNC: 134 MMOL/L (ref 132–146)
WBC NRBC COR # BLD: 7.2 10*3/MM3 (ref 3.5–10.8)

## 2017-08-17 PROCEDURE — 80048 BASIC METABOLIC PNL TOTAL CA: CPT

## 2017-08-17 PROCEDURE — 36415 COLL VENOUS BLD VENIPUNCTURE: CPT

## 2017-08-17 PROCEDURE — 85025 COMPLETE CBC W/AUTO DIFF WBC: CPT

## 2017-08-17 PROCEDURE — 96360 HYDRATION IV INFUSION INIT: CPT

## 2017-08-17 PROCEDURE — 83735 ASSAY OF MAGNESIUM: CPT | Performed by: OBSTETRICS & GYNECOLOGY

## 2017-08-17 RX ORDER — SODIUM CHLORIDE 9 MG/ML
999 INJECTION, SOLUTION INTRAVENOUS ONCE
Status: COMPLETED | OUTPATIENT
Start: 2017-08-17 | End: 2017-08-17

## 2017-08-17 RX ADMIN — SODIUM CHLORIDE 999 ML/HR: 9 INJECTION, SOLUTION INTRAVENOUS at 15:09

## 2017-08-18 ENCOUNTER — DOCUMENTATION (OUTPATIENT)
Dept: GYNECOLOGIC ONCOLOGY | Facility: CLINIC | Age: 82
End: 2017-08-18

## 2017-08-18 NOTE — PROGRESS NOTES
GYN Oncology    Patient in for interval labs given her recent complaints and last laboratory abnormalities.    Lab Results   Component Value Date    WBC 7.20 08/17/2017    HGB 11.3 (L) 08/17/2017    HCT 34.9 08/17/2017    MCV 91.6 08/17/2017     08/17/2017    NEUTROABS 3.40 08/17/2017    GLUCOSE 91 08/17/2017    BUN 10 08/17/2017    CREATININE 1.10 08/17/2017     08/17/2017    K 4.2 08/17/2017     08/17/2017    CO2 25.0 08/17/2017    MG 2.1 08/17/2017    CALCIUM 9.2 08/17/2017    ALBUMIN 3.90 08/09/2017    AST 95 (H) 08/09/2017     (H) 08/09/2017    BILITOT 0.7 08/09/2017     29.0 06/29/2017     Everything starting to normalize.  No additional chemo planned per the patient's request.    Pretty Ngo MD  08/17/17  4:43 PM

## 2017-09-06 ENCOUNTER — OFFICE VISIT (OUTPATIENT)
Dept: GYNECOLOGIC ONCOLOGY | Facility: CLINIC | Age: 82
End: 2017-09-06

## 2017-09-06 ENCOUNTER — APPOINTMENT (OUTPATIENT)
Dept: ONCOLOGY | Facility: HOSPITAL | Age: 82
End: 2017-09-06

## 2017-09-06 VITALS
SYSTOLIC BLOOD PRESSURE: 146 MMHG | WEIGHT: 209 LBS | OXYGEN SATURATION: 97 % | BODY MASS INDEX: 35.87 KG/M2 | HEART RATE: 74 BPM | RESPIRATION RATE: 16 BRPM | DIASTOLIC BLOOD PRESSURE: 78 MMHG | TEMPERATURE: 96.8 F

## 2017-09-06 DIAGNOSIS — C79.82: ICD-10-CM

## 2017-09-06 DIAGNOSIS — R53.83 FATIGUE, UNSPECIFIED TYPE: ICD-10-CM

## 2017-09-06 DIAGNOSIS — C54.1 ENDOMETRIAL CANCER (HCC): Primary | ICD-10-CM

## 2017-09-06 DIAGNOSIS — F43.81 COMPLEX GRIEF DISORDER LASTING LONGER THAN 12 MONTHS: ICD-10-CM

## 2017-09-06 DIAGNOSIS — R53.81 PHYSICAL DECONDITIONING: ICD-10-CM

## 2017-09-06 PROCEDURE — 99024 POSTOP FOLLOW-UP VISIT: CPT | Performed by: OBSTETRICS & GYNECOLOGY

## 2017-09-07 PROBLEM — E86.0 DEHYDRATION: Status: RESOLVED | Noted: 2017-08-15 | Resolved: 2017-09-07

## 2017-09-07 PROBLEM — Z79.899 ON ANTINEOPLASTIC CHEMOTHERAPY: Status: RESOLVED | Noted: 2017-07-24 | Resolved: 2017-09-07

## 2017-11-29 ENCOUNTER — HOSPITAL ENCOUNTER (OUTPATIENT)
Dept: CT IMAGING | Facility: HOSPITAL | Age: 82
Discharge: HOME | End: 2017-11-29
Attending: OBSTETRICS & GYNECOLOGY | Admitting: OBSTETRICS & GYNECOLOGY

## 2017-11-29 ENCOUNTER — OFFICE VISIT (OUTPATIENT)
Dept: GYNECOLOGIC ONCOLOGY | Facility: CLINIC | Age: 82
End: 2017-11-29

## 2017-11-29 VITALS
DIASTOLIC BLOOD PRESSURE: 83 MMHG | OXYGEN SATURATION: 96 % | BODY MASS INDEX: 36.9 KG/M2 | WEIGHT: 215 LBS | HEART RATE: 69 BPM | SYSTOLIC BLOOD PRESSURE: 188 MMHG | RESPIRATION RATE: 20 BRPM | TEMPERATURE: 97.1 F

## 2017-11-29 DIAGNOSIS — C54.1 ENDOMETRIAL CANCER (HCC): ICD-10-CM

## 2017-11-29 DIAGNOSIS — C79.82: ICD-10-CM

## 2017-11-29 DIAGNOSIS — F43.81 COMPLEX GRIEF DISORDER LASTING LONGER THAN 12 MONTHS: ICD-10-CM

## 2017-11-29 DIAGNOSIS — C54.1 ENDOMETRIAL CANCER (HCC): Primary | ICD-10-CM

## 2017-11-29 DIAGNOSIS — R11.2 NON-INTRACTABLE VOMITING WITH NAUSEA, UNSPECIFIED VOMITING TYPE: ICD-10-CM

## 2017-11-29 DIAGNOSIS — R10.32 LEFT INGUINAL PAIN: ICD-10-CM

## 2017-11-29 DIAGNOSIS — G89.18 POSTOPERATIVE PAIN: ICD-10-CM

## 2017-11-29 PROCEDURE — 0 IOPAMIDOL 61 % SOLUTION: Performed by: OBSTETRICS & GYNECOLOGY

## 2017-11-29 PROCEDURE — 99214 OFFICE O/P EST MOD 30 MIN: CPT | Performed by: OBSTETRICS & GYNECOLOGY

## 2017-11-29 PROCEDURE — 74177 CT ABD & PELVIS W/CONTRAST: CPT

## 2017-11-29 PROCEDURE — 82565 ASSAY OF CREATININE: CPT

## 2017-11-29 RX ORDER — OXYCODONE HYDROCHLORIDE 5 MG/1
2.5-5 TABLET ORAL EVERY 4 HOURS PRN
Qty: 15 TABLET | Refills: 0 | Status: SHIPPED | OUTPATIENT
Start: 2017-11-29 | End: 2018-06-07 | Stop reason: SDUPTHER

## 2017-11-29 RX ORDER — ONDANSETRON 4 MG/1
4 TABLET, FILM COATED ORAL EVERY 6 HOURS PRN
Qty: 25 TABLET | Refills: 0 | Status: SHIPPED | OUTPATIENT
Start: 2017-11-29 | End: 2018-06-07 | Stop reason: SDUPTHER

## 2017-11-29 RX ADMIN — IOPAMIDOL 50 ML: 612 INJECTION, SOLUTION INTRAVENOUS at 15:34

## 2017-11-29 NOTE — PROGRESS NOTES
Varsha MOODY Vijay  [id number]  1935    Subjective     Reason for visit:  Surveillance visit    History of present illness:  The patient is a 82 y.o. 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  She underwent minimally invasive staging surgery and opted for adjuvant therapy.  She received one cycle (three weekly treatments) of weekly carboplatin, weekly paclitaxel and then opted to discontinue therapy secondary to weakness.  She presents today for a surveillance visit.      Her biggest complaint today is nausea.  She states she takes Zofran at least twice per day.  She feels the nausea is potentially slightly greater with eating.  She's had 1 episode of emesis.  States the nausea has been present essentially since when she received chemotherapy.  It is present at least every other day.  She feels it is worsening over time.  Notes some epigastric pain as well as some left lower quadrant and groin pain.  The left lower quadrant pain is constant.  She has acid reflux but does not feel this is worsening or changing over time.  No fevers.  Normal daily bowel movements without melena or hematochezia.  Normal urinary function.  States her appetite is diminished though she has gained weight since her last visit.  No vaginal discharge or bleeding.  No fevers or chills.  No issues with her skin.  +Fatigue.    As mentioned in prior notes her daughter  from metastatic breast cancer and the patient has PTSD over this experience and it impairs her perspective on cancer treatment.      Treatment History:    1.  EMB with Dr. Manning showed showed endometrial adenocarcinoma with papillary features and foci of high-grade histology.  Comments stated overall grade 2 with foci of grade 3.    2.  Pre-operative  was 29.  Preoperative CT of the abdomen/pelvis showed an 8 mm node anterior to the left common iliac vein or the proximal left external iliac vein which is a singular lymph node and  is  noteworthy for possible exploration during the surgical resection of the uterus.  3.  On 6/30/17 she underwent a robotic-assisted total laparoscopic hysterectomy, bilateral salpingo-oophorectomy, and bilateral pelvic lymph node dissection.  Her surgery was challenging given the extent of her diverticular disease and adhesions to the left adnexa as well as her significant visceral adiposity.   4.  Final pathology returned showing high-grade papillary serous adenocarcinoma involving the cervix, endocervix, and tumor extending into the left parametrium.  Tumor size was 3.5 cm.  There was full-thickness myometrial invasion.  +LVSI.  There were 0/4 right pelvic lymph nodes and 0/4 left pelvic lymph nodes.  Washings were negative.  5.  She was counseled regarding options for adjuvant therapy including sandwich.  She has opted to proceed with chemotherapy alone.  She received one cycle (three weekly treatments) of weekly carboplatin, weekly paclitaxel and then opted to discontinue therapy secondary to weakness.  Her last treatment was on 8/9/17.    OBGYN History: NSVDx2.  No history of abnormal paps.  Prior RSO via Pfannenstiel for ovarian cysts.    Past Medical History:   Diagnosis Date   • Anxiety    • Arthritis    • Back pain    • BMI 37.0-37.9, adult    • Dyslipidemia    • Endometrial cancer    • GERD (gastroesophageal reflux disease)    • Herniated disc, cervical     and lower back    • HTN (hypertension)    • Hypothyroidism    • Spinal stenosis    • Spinal stenosis     lumbar    • Wears glasses    • Wears partial dentures     upper       Past Surgical History:   Procedure Laterality Date   • APPENDECTOMY OPEN     • BACK SURGERY      x2 lumbar      • BREAST BIOPSY Right     x2, benign disease   • CATARACT EXTRACTION      bilat with lens   2008?   • CHOLECYSTECTOMY OPEN  1987   • COLONOSCOPY     • RIGHT OOPHORECTOMY  1969    Pfannenstiel.  Ovarian cysts.   • TONSILLECTOMY      unsure of adnoids    • TOTAL KNEE  ARTHROPLASTY Left 2007   • TOTAL LAPAROSCOPIC HYSTERECTOMY N/A 6/30/2017    Staging for enometrial cancer   • WISDOM TOOTH EXTRACTION         MEDICATIONS: The current medication list was reviewed with the patient and updated in the EMR this date per the Medical Assistant. Medication dosages and frequencies were confirmed to be accurate.      Allergies:  has No Known Allergies.    Social History: She is .  She lives 45 min away in [place].  She lives by herself and does not work.  She denies use of tobacco, alcohol, and illicit drugs.     Family History:  Denies a history of breast, colon, endometrial, ovarian, and prostate cancer.  No history of melanoma.    Health Maintenance:    1. Mammogram - 12/2015  2. Colonoscopy - 2002    Review of Systems:  CONSTITUTIONAL:  Weight has been stable.  +fatigue.  No fever.  No chills, night sweats.  PSYCHIATRIC: +anxiety.  No history of depression.  No bipolar disorder or insomnia.  EYES: Vision is unchanged.  No photophobia.  EARS, NOSE, MOUTH, AND THROAT: Hearing normal. No swallowing difficulties.  No sore throat.  ENDOCRINE: No history of diabetes.  +thyroid disease.  LYMPHATIC: No enlarged lymph nodes  RESPIRATORY: No shortness of breath, cough, asthma or wheezing.  No hemoptysis.  CARDIOVASCULAR: No angina, orthopnea.  No edema.  +HTN.  +hyperlipidemia.  GASTROINTESTINAL: No constipation.  No diarrhea.  No melena.  +reflux.  +nausea.  No vomiting.  GENITOURINARY: No dysuria, hematuria, urgency, or frequency.  NEUROLOGIC: +less weakness.  No numbness, motor disturbance, syncope, seizure, or headaches.  MUSCULOSKELETAL: +joint pain.  No muscle weakness.  INTEGUMENTARY: No new skin lesions.  GYNECOLOGIC: Per history of present illness.  HEMATOLOGIC: No bleeding problems.  Denies easy bruising.    Objective      Physical Exam  Vitals:    11/29/17 1334   BP: (!) 188/83   Pulse: 69   Resp: 20   Temp: 97.1 °F (36.2 °C)   SpO2: 96%     Body mass index is 36.9  kg/(m^2).    Wt Readings from Last 3 Encounters:   11/29/17 215 lb (97.5 kg)   09/06/17 209 lb (94.8 kg)   08/17/17 212 lb (96.2 kg)     ECOG PS 0    GENERAL: Alert, well-appearing elderly female in no apparent distress.  She appears her stated age.  She is here with her son.  HEENT: Sclera anicteric, normal eyelids. Head normocephalic, atraumatic. Mucus membranes moist.  Normal dentition.    NECK: Trachea midline, supple, without masses.  No thyromegaly.      CARDIOVASCULAR: Normal rate, regular rhythm, no murmurs, rubs, or gallops.  Extremities symmetric.  No peripheral edema.  RESPIRATORY: Clear to auscultation bilaterally, normal respiratory effort  GASTROINTESTINAL:  Soft, obese, non-tender apart from minimal epigastric pain with deep palpation, non-distended, no rebound or guarding, no masses, or hernias.  No HSM.  Incisions - healed LSC incisions.  MUSCULOSKELETAL:  Normal gait and station.  FROMx4.  No digital cyanosis.    SKIN:  Warm, dry, well-perfused.  All visible areas intact.  No rashes, lesions, ulcers.  PSYCHIATRIC: AO x3, with appropriate affect, normal thought processes  LYMPHATICS:  No cervical or inguinal adenopathy noted.   PELVIC: Normal external female genitalia without lesions or skin changes.  Urethra midline.  Vagina without lesions.  Cuff intact, healed, and without visible lesions.  On bimanual exam vagina and cuff are smooth and without nodularity.  No palpable abdominal masses. Rectovaginal exam confirmatory.      Diagnostic Data:    None today      Assessment/Plan  This is a 82 y.o. 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  Endometrial cancer:    -No evidence of disease on exam, however, she is at high risk for cancer recurrence given that she did not have adjuvant therapy.  With the new onset nausea and ordered a CT of abdomen and pelvis.  I will call with those results and we can arrange follow-up accordingly.    2.   Nausea:  -Differential diagnosis is very broad.  Start with CT abdomen and pelvis to exclude cancer recurrence.  Thereafter may merit referral to GI for full workup  -I refilled her Zofran today    3.  Abdominal pain:  -She is using the oxycodone very sparingly.  Her prescription is from July and at that time postop she only had 25 tablets.    -I agreed to refill the oxycodone today #15.  She can take 1/2-1 tablet as needed while we are working up the etiology of her pain    4.  Follow up:   -I will call with the CT results and we can arrange follow-up accordingly.  -Typical f/u would be 3 months with Dr. Fink          Electronically Signed by: Pretty Ngo MD  Date: 11/29/2017      Time:  5:05 PM    Note: Speech recognition transcription software was used to dictate portions of this document.  An attempt at proofreading has been made though minor errors in transcription may still be present.  Please do not hesitate to call our office with any questions.

## 2017-12-01 DIAGNOSIS — R11.0 NAUSEA: ICD-10-CM

## 2017-12-01 DIAGNOSIS — K57.32 DIVERTICULITIS OF LARGE INTESTINE WITHOUT PERFORATION OR ABSCESS WITHOUT BLEEDING: Primary | ICD-10-CM

## 2017-12-01 RX ORDER — AMOXICILLIN AND CLAVULANATE POTASSIUM 875; 125 MG/1; MG/1
1 TABLET, FILM COATED ORAL 2 TIMES DAILY
Qty: 14 TABLET | Refills: 0 | Status: SHIPPED | OUTPATIENT
Start: 2017-12-01 | End: 2017-12-18

## 2017-12-01 NOTE — PROGRESS NOTES
GYN Oncology    Telephone Call    I reviewed her CT.  No evidence of cancer.  There is some possible mild acute sigmoid diverticulitis that be be responsible for her chronic LLQ pain.  There is also some mild pericolonic stranding at the second portion of the duodenum that could be contributing to her nausea.    I recommend a 7 day course of treatment for the diverticulitis.  She is otherwise clinically stable and suitable for outpatient treatment.  I think she would do better with Augmentin than a Metronidazole-containing regimen given the nausea.  Also recommend referral to GI for ongoing evaluation and work-up of other etiologies for her nausea.    I tried calling the patient and there was no answer.  I left a voicemail for her son to call me.    I sent Augmentin in to the pharmacy for a 7 day BID course.    CT 11/29/17  ABDOMEN: The lung bases are grossly clear. Small hiatal hernia  identified. The liver is homogeneous in appearance as well as the  spleen. Surgical clips in the right upper quadrant from prior  cholecystectomy. Renal cortical cysts seen in both kidneys with adrenal  glands within normal limits. The pancreas is homogeneous. No abdominal  or retroperitoneal lymphadenopathy. No free fluid or free air.  Diverticulosis of the colon with mild wall thickening identified of the  colon and possible mild acute sigmoid diverticulitis. There is some  surrounding mild pericolonic stranding at the second portion of the  duodenum. No free fluid or free air. No abnormal mass or fluid  collection is identified.  PELVIS: The pelvic organs are unremarkable. Small amount of physiologic  free fluid seen within the pelvis. No abnormal mass or fluid collection  is identified. No pelvic adenopathy. Degenerative changes identified  within the spine and pelvis.  Delayed imaging reveals contrast seen in the renal collecting systems  bilaterally as well as within the ureters and bladder with no evidence  of  obstruction.  IMPRESSION:  Free fluid seen within the pelvis likely physiologic.  Diverticulosis of the colon with some wall thickening of the sigmoid  colon suggesting possibly a mild acute sigmoid diverticulitis.    Pretty Ngo MD  12/01/17  9:30 AM      GYN Oncology    Telephone Call    Her son returned my call and I reviewed the information above and also gave precautions for si/sx of worsening diverticulitis.    Pretty Ngo MD  12/01/17  10:40 AM      GYN Oncology    Telephone Call    I also called and spoke with the patient about the plan.    I sent her to the  to make a 3 month follow up with us (either Dr. Fink or NEVAEH).    Pretty Ngo MD  12/01/17  12:51 PM

## 2017-12-05 LAB — CREAT BLDA-MCNC: 1.2 MG/DL (ref 0.6–1.3)

## 2017-12-18 ENCOUNTER — OFFICE VISIT (OUTPATIENT)
Dept: GASTROENTEROLOGY | Facility: CLINIC | Age: 82
End: 2017-12-18

## 2017-12-18 VITALS
OXYGEN SATURATION: 95 % | HEART RATE: 63 BPM | BODY MASS INDEX: 36.64 KG/M2 | SYSTOLIC BLOOD PRESSURE: 150 MMHG | WEIGHT: 214.6 LBS | TEMPERATURE: 96.7 F | HEIGHT: 64 IN | DIASTOLIC BLOOD PRESSURE: 78 MMHG

## 2017-12-18 DIAGNOSIS — R11.0 NAUSEA: Primary | ICD-10-CM

## 2017-12-18 DIAGNOSIS — R63.0 LOSS OF APPETITE: ICD-10-CM

## 2017-12-18 DIAGNOSIS — R68.2 DRY MOUTH: ICD-10-CM

## 2017-12-18 DIAGNOSIS — K57.32 SIGMOID DIVERTICULITIS: ICD-10-CM

## 2017-12-18 PROCEDURE — 99204 OFFICE O/P NEW MOD 45 MIN: CPT | Performed by: NURSE PRACTITIONER

## 2017-12-18 RX ORDER — MIRTAZAPINE 15 MG/1
15 TABLET, ORALLY DISINTEGRATING ORAL NIGHTLY
Qty: 30 TABLET | Refills: 0 | Status: SHIPPED | OUTPATIENT
Start: 2017-12-18 | End: 2017-12-21

## 2017-12-18 NOTE — PATIENT INSTRUCTIONS
Nausea, Adult  Nausea is the feeling of an upset stomach or having to vomit. Nausea on its own is not usually a serious concern, but it may be an early sign of a more serious medical problem. As nausea gets worse, it can lead to vomiting. If vomiting develops, or if you are not able to drink enough fluids, you are at risk of becoming dehydrated. Dehydration can make you tired and thirsty, cause you to have a dry mouth, and decrease how often you urinate. Older adults and people with other diseases or a weak immune system are at higher risk for dehydration. The main goals of treating your nausea are:  · To limit repeated nausea episodes.  · To prevent vomiting and dehydration.  HOME CARE INSTRUCTIONS  Follow instructions from your health care provider about how to care for yourself at home.  Eating and Drinking  Follow these recommendations as told by your health care provider:  · Take an oral rehydration solution (ORS). This is a drink that is sold at pharmacies and retail stores.  · Drink clear fluids in small amounts as you are able. Clear fluids include water, ice chips, diluted fruit juice, and low-calorie sports drinks.  · Eat bland, easy-to-digest foods in small amounts as you are able. These foods include bananas, applesauce, rice, lean meats, toast, and crackers.  · Avoid drinking fluids that contain a lot of sugar or caffeine, such as energy drinks, sports drinks, and soda.  · Avoid alcohol.  · Avoid spicy or fatty foods.  General Instructions  · Drink enough fluid to keep your urine clear or pale yellow.  · Wash your hands often. If soap and water are not available, use hand .  · Make sure that all people in your household wash their hands well and often.  · Rest at home while you recover.  · Take over-the-counter and prescription medicines only as told by your health care provider.  · Breathe slowly and deeply when you feel nauseous.  · Watch your condition for any changes.  · Keep all follow-up  visits as told by your health care provider. This is important.  SEEK MEDICAL CARE IF:  · You have a headache.  · You have new symptoms.  · Your nausea gets worse.  · You have a fever.  · You feel light-headed or dizzy.  · You vomit.  · You cannot keep fluids down.  SEEK IMMEDIATE MEDICAL CARE IF:  · You have pain in your chest, neck, arm, or jaw.  · You feel extremely weak or you faint.  · You have vomit that is bright red or looks like coffee grounds.  · You have bloody or black stools or stools that look like tar.  · You have a severe headache, a stiff neck, or both.  · You have severe pain, cramping, or bloating in your abdomen.  · You have a rash.  · You have difficulty breathing or are breathing very quickly.  · Your heart is beating very quickly.  · Your skin feels cold and clammy.  · You feel confused.  · You have pain when you urinate.  · You have signs of dehydration, such as:    Dark urine, very little, or no urine.    Cracked lips.    Dry mouth.    Sunken eyes.    Sleepiness.    Weakness.  These symptoms may represent a serious problem that is an emergency. Do not wait to see if the symptoms will go away. Get medical help right away. Call your local emergency services (911 in the [place] Do not drive yourself to the hospital.     This information is not intended to replace advice given to you by your health care provider. Make sure you discuss any questions you have with your health care provider.     Document Released: 01/25/2006 Document Revised: 04/10/2017 Document Reviewed: 08/23/2016  [employer] Patient Education ©2017 [employer]

## 2017-12-18 NOTE — PROGRESS NOTES
OUTPATIENT NEW PATIENT NOTE  Patient: VARSHA BLANC : 1935  Date of Service: 2017  Dear Dr.David Magnus Estrada MD   Thank you for your referral of this patient for evaluation of diverticulitis  CC: Diverticulitis  Assessment/Plan                                             ASSESSMENT & PLANS     Nausea  Loss of appetite  -     NM Gastric Emptying; Future  -     Start mirtazapine (REMERON SOL-TAB) 15 MG disintegrating tablet; Take 1 tablet by mouth Every Night.  · Selin roots tabs 1-2 tabs TID before meals  · Continue PRN Zofran    Dry mouth  · Sugar-free lemon drops  · DC Listerine.  Start Biotene as previously recommended  · Stop Bentyl    Sigmoid diverticulitis    Follow Up: Return in about 8 weeks (around 2018) for Recheck.      DISCUSSION: The above plan was delineated in details with patient and daughter and all questions and concerns were answered.  Patient is also given contact information.  Patient is to return as scheduled or sooner if new problems arise  Subjective                                                     SUBJECTIVE   History of Present Illness  Ms. Varsha Blanc is a 82 y.o. female presents to the clinic today for an evaluation of Diverticulitis  Pt has endometrial cancer.  Underwent surgery 2017. She started chemo fr July through Aug.   States the nausea has been present essentially since when she received chemotherapy, which pt has had 3 cycles.  Pt quit after 3 cycles of chemo d/t nausea. She feels the nausea is potentially slightly greater with eating.  Only vomited 2x since chemo. Mostly nausea alone. It is present at least every other day.  She feels it is worsening over time.  No appetite. Pt denies fever, chills, night sweats, or unintentional weight loss. Lots of dry mouth. Does not use biotene as previously recommended by her doctor.  Takes Bentyl and PRN Oxycodone, which makes dry mouth worsened.  She states she takes Zofran at least twice per day  w/o sx improvement. Some improvement of nausea since pt not taking chemo.      +++ some epigastric pain as well as some left lower quadrant and groin pain.  The left lower quadrant pain is constant, but has been there since surgery in 2017.  LLQ throbbing pain, which is intermittent. Nothing makes it worse or better.  No correlation with eating or having a bowel movement.  Pain does not wake pt at night. Pain is not tender to touch. Pain intensity is the same since surgery.  Nausea is more bothersome abd pain. She has acid reflux but does not feel this is worsening or changing over time.  Heating pad help some.  CT scan show possible acute sigmoid diverticulitis w/ mild colon wall thickening and some surrounding mild colonic straining at the second portion of the duodenum.  Augmentin for a 7-day BID course.  No difference in nausea or abd pain    No change in BM pattern.  Has daily BM w/ normal soft stools. No straining. Occasional mild constipation, but relieved w/ Miralax. Pt denies dark black stools or bright red blood in the stools, in the toilet bowl, or on the toilet tissue.  Normal urinary function.    +Fatigue.  Last colonoscopy was about 8-10 yrs. Done at     Her daughter  from metastatic breast cancer and the patient has PTSD over this experience and it impairs her perspective on cancer treatment.  No known diabetes or thyroid disease.     ROS:Review of Systems   Constitutional: Positive for fatigue. Negative for activity change, appetite change, fever and unexpected weight change.   HENT: Negative for hearing loss, trouble swallowing and voice change.    Eyes: Negative for visual disturbance.   Respiratory: Positive for cough and shortness of breath. Negative for choking and chest tightness.    Cardiovascular: Negative for chest pain.   Gastrointestinal: Positive for abdominal pain, constipation and nausea. Negative for abdominal distention, anal bleeding, blood in stool, diarrhea, rectal pain and  vomiting.        Reflux- mild   Endocrine: Negative for polydipsia and polyphagia.   Genitourinary: Negative.    Musculoskeletal: Positive for gait problem. Negative for joint swelling.   Skin: Negative for color change and rash.   Allergic/Immunologic: Negative for food allergies.   Neurological: Positive for dizziness. Negative for seizures and speech difficulty.   Hematological: Negative for adenopathy.   Psychiatric/Behavioral: Negative for confusion.     Subjective   PAST MED HX: Pt  has a past medical history of Anxiety; Arthritis; Back pain; BMI 37.0-37.9, adult; Dyslipidemia; Endometrial cancer; GERD (gastroesophageal reflux disease); Herniated disc, cervical; HTN (hypertension); Hypothyroidism; Spinal stenosis; Spinal stenosis; Wears glasses; and Wears partial dentures.  PAST SURG HX: Pt  has a past surgical history that includes Total knee arthroplasty (Left, 2007); Right oophorectomy (1969); Appendectomy open; Cholecystectomy open (1987); Back surgery; Breast biopsy (Right); Cataract extraction; Tonsillectomy; [place] tooth extraction; Colonoscopy; and total laparoscopic hysterectomy (N/A, 6/30/2017).  FAM HX: Family history is unknown by patient.  SOC HX: Pt  reports that she has never smoked. She has never used smokeless tobacco. She reports that she does not drink alcohol or use illicit drugs.  Objective                                                           OBJECTIVE   Allergy: Pt has No Known Allergies.  MEDS: •  ALPRAZolam (XANAX) 0.5 MG tablet, Take 0.5 mg by mouth 4 (Four) Times a Day As Needed for Anxiety., Disp: , Rfl:   •  benazepril (LOTENSIN) 20 MG tablet, Take 20 mg by mouth Daily., Disp: , Rfl:   •  citalopram (CeleXA) 20 MG tablet, Take 20 mg by mouth Daily., Disp: , Rfl:   •  dicyclomine (BENTYL) 10 MG capsule, Take 10 mg by mouth 3 (Three) Times a Day As Needed (gerd)., Disp: , Rfl:   •  hydrochlorothiazide (HYDRODIURIL) 12.5 MG tablet, Take 12.5 mg by mouth Daily., Disp: , Rfl:   •   levothyroxine (SYNTHROID, LEVOTHROID) 100 MCG tablet, Take 100 mcg by mouth Daily., Disp: , Rfl:   •  Multiple Vitamins-Minerals (OCUVITE EXTRA PO), Take 1 capsule by mouth Daily., Disp: , Rfl:   •  ondansetron (ZOFRAN) 4 MG tablet, Take 1 tablet by mouth Every 6 (Six) Hours As Needed for Nausea or Vomiting., Disp: 25 tablet, Rfl: 0  •  oxyCODONE (ROXICODONE) 5 MG immediate release tablet, Take 0.5-1 tablets by mouth Every 4 (Four) Hours As Needed for Moderate Pain ., Disp: 15 tablet, Rfl: 0  •  pravastatin (PRAVACHOL) 40 MG tablet, Take 40 mg by mouth Daily., Disp: , Rfl:   •  prochlorperazine (COMPAZINE) 10 MG tablet, Take 1 tablet by mouth Every 6 (Six) Hours As Needed for Nausea or Vomiting., Disp: 30 tablet, Rfl: 5  •  raNITIdine (ZANTAC) 150 MG tablet, Take 150 mg by mouth 2 (Two) Times a Day., Disp: , Rfl:   Lab Results   Component Value Date    WBC 7.20 08/17/2017    HGB 11.3 (L) 08/17/2017    HGB 11.7 08/09/2017    HGB 12.9 08/02/2017    HCT 34.9 08/17/2017    HCT 35.7 08/09/2017    HCT 39.3 08/02/2017    MCV 91.6 08/17/2017    MCHC 32.4 08/17/2017     08/17/2017     08/09/2017     08/02/2017     Lab Results   Component Value Date     08/17/2017    K 4.2 08/17/2017     08/17/2017    CO2 25.0 08/17/2017    BUN 10 08/17/2017    CREATININE 1.20 11/29/2017    GLUCOSE 91 08/17/2017    CALCIUM 9.2 08/17/2017    ANIONGAP 8.0 08/17/2017     Lab Results   Component Value Date    AST 95 (H) 08/09/2017    AST 19 08/02/2017    AST 22 07/26/2017     (H) 08/09/2017    ALT 23 08/02/2017    ALT 19 07/26/2017    ALKPHOS 104 (H) 08/09/2017    ALKPHOS 66 08/02/2017    ALKPHOS 71 07/26/2017    BILITOT 0.7 08/09/2017    PROTEINTOT 6.1 08/09/2017    ALBUMIN 3.90 08/09/2017   EXAMINATION: CT ABDOMEN AND PELVIS WITH CONTRAST-11/29/2017:       INDICATION: Upper abdominal pain and intractable nausea, high risk  endometrial cancer; C54.1-Malignant neoplasm of endometrium, upper  abdominal  pain.      TECHNIQUE: Multiple axial CT imaging was obtained of the abdomen and  pelvis following the administration of intravenous contrast.      The radiation dose reduction device was turned on for each scan per the  ALARA (As Low as Reasonably Achievable) protocol.      COMPARISON: NONE.      FINDINGS:       ABDOMEN: The lung bases are grossly clear. Small hiatal hernia  identified. The liver is homogeneous in appearance as well as the  spleen. Surgical clips in the right upper quadrant from prior  cholecystectomy. Renal cortical cysts seen in both kidneys with adrenal  glands within normal limits. The pancreas is homogeneous. No abdominal  or retroperitoneal lymphadenopathy. No free fluid or free air.  Diverticulosis of the colon with mild wall thickening identified of the  colon and possible mild acute sigmoid diverticulitis. There is some  surrounding mild pericolonic stranding at the second portion of the  duodenum. No free fluid or free air. No abnormal mass or fluid  collection is identified.      PELVIS: The pelvic organs are unremarkable. Small amount of physiologic  free fluid seen within the pelvis. No abnormal mass or fluid collection  is identified. No pelvic adenopathy. Degenerative changes identified  within the spine and pelvis.      Delayed imaging reveals contrast seen in the renal collecting systems  bilaterally as well as within the ureters and bladder with no evidence  of obstruction.      IMPRESSION:  Free fluid seen within the pelvis likely physiologic.  Diverticulosis of the colon with some wall thickening of the sigmoid  colon suggesting possibly a mild acute sigmoid diverticulitis.    Wt Readings from Last 5 Encounters:   12/18/17 97.3 kg (214 lb 9.6 oz)   11/29/17 97.5 kg (215 lb)   09/06/17 94.8 kg (209 lb)   08/17/17 96.2 kg (212 lb)   08/09/17 95.7 kg (211 lb)   body mass index is 36.84 kg/(m^2).,Temp: 96.7 °F (35.9 °C),BP: 150/78,Heart Rate: 63   Physical Exam  General Well  developed; well nourished; no acute distress.   ENT Oral mucosa pink and moist without thrush or lesions.    Neck Neck supple; trachea midline. No thyromegaly   Resp CTA; no rhonchi, rales, or wheezes.  Respiration effort normal  CV RRR; normal S1, S2; no M/R/G. No lower extremity edema  GI Abd soft, NT, ND, normal active bowel sounds.  No HSM.  No abd hernia  Skin No rash; no lesions; no bruises.  Skin turgor normal  Musc No clubbing; no cyanosis.  Gait steady  Psych Oriented to time, place, and person.  Appropriate affect  Thank you kindly for allowing me to be part of this patient’s care.    Pt care team: [facility]Gastroenterology  [phone]    CC: Dr.David Magnus Estrada MD  [address] [place] [address] DR AIKEN [facility] / [place] [id number] FAX:[phone]

## 2017-12-20 ENCOUNTER — TELEPHONE (OUTPATIENT)
Dept: GASTROENTEROLOGY | Facility: CLINIC | Age: 82
End: 2017-12-20

## 2017-12-20 NOTE — TELEPHONE ENCOUNTER
Zo with pharmacy called to see if we could switch Mirtazapine ODT to regular tablets for insurance coverage.

## 2017-12-20 NOTE — TELEPHONE ENCOUNTER
Talked to patient this morning. Patient stated she can swallow the Mirtazapine 15 mg plain tablet. Co pay is $6. I will inform pharmacy regarding change.

## 2017-12-21 RX ORDER — MIRTAZAPINE 15 MG/1
15 TABLET, FILM COATED ORAL NIGHTLY
Qty: 30 TABLET | Refills: 2 | Status: SHIPPED | OUTPATIENT
Start: 2017-12-21

## 2018-03-01 ENCOUNTER — OFFICE VISIT (OUTPATIENT)
Dept: GYNECOLOGIC ONCOLOGY | Facility: CLINIC | Age: 83
End: 2018-03-01

## 2018-03-01 ENCOUNTER — APPOINTMENT (OUTPATIENT)
Dept: LAB | Facility: HOSPITAL | Age: 83
End: 2018-03-01

## 2018-03-01 VITALS
DIASTOLIC BLOOD PRESSURE: 71 MMHG | HEART RATE: 76 BPM | RESPIRATION RATE: 14 BRPM | SYSTOLIC BLOOD PRESSURE: 133 MMHG | TEMPERATURE: 97.6 F | WEIGHT: 210 LBS | OXYGEN SATURATION: 97 % | BODY MASS INDEX: 36.05 KG/M2

## 2018-03-01 DIAGNOSIS — C54.1 ENDOMETRIAL CANCER (HCC): Primary | ICD-10-CM

## 2018-03-01 PROCEDURE — 36415 COLL VENOUS BLD VENIPUNCTURE: CPT | Performed by: NURSE PRACTITIONER

## 2018-03-01 PROCEDURE — 86304 IMMUNOASSAY TUMOR CA 125: CPT | Performed by: NURSE PRACTITIONER

## 2018-03-01 PROCEDURE — 99213 OFFICE O/P EST LOW 20 MIN: CPT | Performed by: NURSE PRACTITIONER

## 2018-03-01 NOTE — PROGRESS NOTES
GYN ONCOLOGY CANCER SURVEILLANCE FOLLOW-UP    Varsha Linares  [id number]  1935    Chief Complaint: Follow-up (no complaints )        History of present illness:  Varsha Linares is a 82 y.o. year old female who is here today for ongoing surveillance of Endometrial Cancer, see Cancer History. She reports she is feeling very well today and has no complaints. She denies vaginal bleeding, pelvic pain, and changes in bowel or bladder function. Following her last visit with Dr. Ngo she underwent CT scan and GI evaluation of diverticulitis. She has completed her recommended regimen and symptoms have improved. She reports good appetite and denies abdominal pain.           Cancer History:      Endometrial cancer    6/2017 Initial Diagnosis     Patient presented to Dr. Manning with acute severe PMB. Pap smear revealed presence of neoplastic cells. EMB showed endometrioid adenocarcinoma with papillary features and foci of high grade histology. Referred to Gyn Oncology         6/29/2017 Imaging     Pre-op CT showed 8 mm lymph node anterior to left common iliac vein or proximal to left external iliac of concern. Pre-op CA-125 = 29         6/30/2017 Surgery     RTLH/BSO and bilateral bilateral pelvic lymph node dissection with Dr. Ngo. Surgery challenging due to diverticular disease, adhesions, and adiposity.     Final pathology showed 3.5 cm high grade papillary serous tumor involving the cervix, endocervix, and extending into the left parametrium. There was full thickness myometrial invasion and + lymphvascular space invasion. All lymph nodes and pelvic washings negative.            7/26/2017 - 8/15/2017 Chemotherapy     Adjuvant sandwich treatment offered, but patient opted for chemotherapy only x 3 planned cycles.   Carbo with weekly paclitaxel x 1 full cycle (3 weekly treatments) completed, then patient opted to discontinue due to weakness.          11/29/2017 Imaging     CT for abdominal pain negative for  disease. Physiologic free fluid noted, diverticulosis with mild actue sigmoid diverticulitis noted. Pt referred to GI for management.            Past Medical History:   Diagnosis Date   • Anxiety    • Arthritis    • Back pain    • BMI 37.0-37.9, adult    • Dyslipidemia    • Endometrial cancer    • GERD (gastroesophageal reflux disease)    • Herniated disc, cervical     and lower back    • HTN (hypertension)    • Hypothyroidism    • Spinal stenosis    • Spinal stenosis     lumbar    • Wears glasses    • Wears partial dentures     upper       Past Surgical History:   Procedure Laterality Date   • APPENDECTOMY OPEN     • BACK SURGERY      x2 lumbar      • BREAST BIOPSY Right     x2, benign disease   • CATARACT EXTRACTION      bilat with lens   2008?   • CHOLECYSTECTOMY OPEN  1987   • COLONOSCOPY     • RIGHT OOPHORECTOMY  1969    Pfannenstiel.  Ovarian cysts.   • TONSILLECTOMY      unsure of adnoids    • TOTAL KNEE ARTHROPLASTY Left 2007   • TOTAL LAPAROSCOPIC HYSTERECTOMY N/A 6/30/2017    Staging for enometrial cancer   • WISDOM TOOTH EXTRACTION         MEDICATIONS: The current medication list was reviewed and reconciled.     Allergies:  has No Known Allergies.    Family History   Problem Relation Age of Onset   • Family history unknown: Yes       Last imaging study was CT 11/29/2017.   Tumor marker:  Lab Results   Component Value Date     29.0 06/29/2017       Review of Systems   Constitutional: Negative for fatigue, fever and unexpected weight change.   HENT: Negative for congestion, ear pain, hearing loss, sinus pressure and trouble swallowing.    Eyes: Negative for visual disturbance.   Respiratory: Negative for cough, chest tightness, shortness of breath and wheezing.    Cardiovascular: Negative for chest pain, palpitations and leg swelling.   Gastrointestinal: Negative for abdominal distention, abdominal pain, constipation, diarrhea, nausea and vomiting.   Endocrine: Negative for cold intolerance, heat  intolerance, polydipsia, polyphagia and polyuria.   Genitourinary: Negative for difficulty urinating, dyspareunia, dysuria, frequency, hematuria, pelvic pain, urgency, vaginal bleeding, vaginal discharge and vaginal pain.   Musculoskeletal: Negative for arthralgias, gait problem, joint swelling and myalgias.   Skin: Negative for color change, pallor and rash.   Neurological: Negative for dizziness, seizures, syncope, weakness, light-headedness, numbness and headaches.   Hematological: Negative for adenopathy. Does not bruise/bleed easily.   Psychiatric/Behavioral: Negative for agitation, confusion, sleep disturbance and suicidal ideas. The patient is not nervous/anxious.        Physical Exam  Vital Signs: /71  Pulse 76  Temp 97.6 °F (36.4 °C) (Temporal Artery )   Resp 14  Wt 95.3 kg (210 lb)  SpO2 97%  BMI 36.05 kg/m2   General Appearance:  alert, cooperative, no apparent distress, appears stated age and obese   Neurologic/Psychiatric: A&O x 3, gait steady, appropriate affect   HEENT:  Normocephalic, without obvious abnormality, mucous membranes moist   Neck: Supple, symmetrical, trachea midline, no adenopathy;  No thyromegaly, masses, or tenderness   Back:   Symmetric, no curvature, ROM normal, no CVA tenderness   Lungs:   Clear to auscultation bilaterally; respirations regular, even, and unlabored bilaterally   Heart:  Regular rate and rhythm, no murmurs appreciated   Breasts:  deferred   Abdomen:   Soft, non-tender, non-distended, no organomegaly and Exam limited d/t habitus.   Lymph nodes: No cervical, supraclavicular, inguinal or axillary adenopathy noted   Extremities: Normal, atraumatic; no clubbing, cyanosis, or edema    Pelvic: External Genitalia  atrophic, without lesions  Vagina  is pale, atrophic.   Vaginal Cuff  Female Vaginal Cuff: smooth, intact and without visible lesions  Uterus  surgically absent and no palpable masses  Ovaries  surgically absent bilaterallly  Parametria   smooth  Rectovaginal  Female rectovaginal: confirms no masses or bleeding and Hemoccult negative     ECOG Performance Status: 0 - Asymptomatic    Procedure Note:  No notes on file      Assessment and Plan:  Varsha was seen today for follow-up.    Diagnoses and all orders for this visit:    Endometrial cancer  -             There is no evidence of disease upon today's exam. Due to her advanced stage papillary serous disease, CA-125 ordered today as an additional screening. May follow this annually. Initial CA-125 was 29 at diagnosis. She will be notified of any concerning elevations. She is understanding to call with any changes in pelvic symptoms or general GYN concerns.       Return in about 3 months (around 6/1/2018) for ongoing cancer surveillance.      Rose Marie Adame, NEVAEH        Note: Speech recognition transcription software was used to dictate portions of this document.  An attempt at proofreading has been made though minor errors in transcription may still be present.  Please do not hesitate to call our office with any questions.

## 2018-03-02 ENCOUNTER — TELEPHONE (OUTPATIENT)
Dept: GYNECOLOGIC ONCOLOGY | Facility: CLINIC | Age: 83
End: 2018-03-02

## 2018-03-02 LAB — CANCER AG125 SERPL-ACNC: 47.5 U/ML (ref 0–38.1)

## 2018-03-02 NOTE — TELEPHONE ENCOUNTER
----- Message from Ashley Clarke sent at 3/2/2018  2:08 PM EST -----  Regarding: labs  Contact: [phone]  Please call with lab results  Phoned pt,no answer.  Pt returned call to office, informed her lab not back yet, try back on Monday. Pt v/u, will call Monday.

## 2018-03-05 ENCOUNTER — TELEPHONE (OUTPATIENT)
Dept: OBSTETRICS AND GYNECOLOGY | Facility: CLINIC | Age: 83
End: 2018-03-05

## 2018-03-05 DIAGNOSIS — C54.1 ENDOMETRIAL CANCER (HCC): Primary | ICD-10-CM

## 2018-03-05 NOTE — TELEPHONE ENCOUNTER
Called patient to notify of CA-125 results. This was found to be elevated at 47.5. We agreed upon a plan to repeat lab in 1 month. I will call her with those results and we will discuss options for ongoing observation, imaging, or treatments. She v/u.

## 2018-03-06 ENCOUNTER — TELEPHONE (OUTPATIENT)
Dept: GYNECOLOGIC ONCOLOGY | Facility: CLINIC | Age: 83
End: 2018-03-06

## 2018-03-06 NOTE — TELEPHONE ENCOUNTER
----- Message from Ashley Clarke sent at 3/6/2018 12:25 PM EST -----  Regarding: CALL BACK  Son Ru phoned and left a message that his mom was confused about the results of the blood test. He would like for you to call him at [phone]

## 2018-03-06 NOTE — TELEPHONE ENCOUNTER
Returned call to patient's son with her permission. He was unsure of the potential implications of her elevated CA-125 level. We discussed this further and all questions answered to his satisfaction. Encouraged to call anytime with questions or concerns. He is in agreement with plan to repeat lab in 1 month.

## 2018-04-05 ENCOUNTER — RESULTS ENCOUNTER (OUTPATIENT)
Dept: OBSTETRICS AND GYNECOLOGY | Facility: CLINIC | Age: 83
End: 2018-04-05

## 2018-04-05 DIAGNOSIS — C54.1 ENDOMETRIAL CANCER (HCC): ICD-10-CM

## 2018-04-10 ENCOUNTER — LAB (OUTPATIENT)
Dept: LAB | Facility: HOSPITAL | Age: 83
End: 2018-04-10

## 2018-04-10 DIAGNOSIS — C54.1 ENDOMETRIAL SARCOMA (HCC): Primary | ICD-10-CM

## 2018-04-10 LAB — CANCER AG125 SERPL QL: 49.6 U/ML (ref 0–30.2)

## 2018-04-10 PROCEDURE — 86304 IMMUNOASSAY TUMOR CA 125: CPT

## 2018-04-10 PROCEDURE — 36415 COLL VENOUS BLD VENIPUNCTURE: CPT

## 2018-04-12 ENCOUNTER — TELEPHONE (OUTPATIENT)
Dept: GYNECOLOGIC ONCOLOGY | Facility: CLINIC | Age: 83
End: 2018-04-12

## 2018-04-12 DIAGNOSIS — C54.1 ENDOMETRIAL CANCER (HCC): Primary | ICD-10-CM

## 2018-04-12 NOTE — TELEPHONE ENCOUNTER
Called patient to notify of CA-125 results. Overall stable from 1 month ago. We discussed options to observe and repeat blood work and CT only if symptomatic vs. CT now for further evaluation. She also has a history of diverticulitis and understands that this condition can also increase inflammation in the abd/pelvis. She c/o occasional nausea. After some consideration, she states that she does want to pursue CT soon, so if she needs further GI evaluation or has a cancer recurrence that she can make decisions regarding potential treatment options.   I will order CT now. She would like to schedule this for early May if possible as her son will be out of town x 2 weeks at the end of this month. I think that is acceptable. I will have staff prior authorize and they will call her with date and time.

## 2018-05-01 ENCOUNTER — TELEPHONE (OUTPATIENT)
Dept: GYNECOLOGIC ONCOLOGY | Facility: CLINIC | Age: 83
End: 2018-05-01

## 2018-05-01 NOTE — TELEPHONE ENCOUNTER
----- Message from Kale Walker sent at 4/30/2018  3:43 PM EDT -----  Regarding: BLEEDING  Contact: [phone]  Pt has been bleeding off and on for about 2 weeks.     05/01/18 at 8:37am:  I returned pt's call.  She has had a small amount of vaginal spotting off and on for the past 2 weeks.  She is scheduled for a CT scan in 2 days.  I discussed with MOO Adame and per her order Ct to be added as a call report and pt will be seen in our office same day as scan to review and go from there.  Pt agreed with plan and verbalized understanding.

## 2018-05-03 ENCOUNTER — OFFICE VISIT (OUTPATIENT)
Dept: GYNECOLOGIC ONCOLOGY | Facility: CLINIC | Age: 83
End: 2018-05-03

## 2018-05-03 ENCOUNTER — HOSPITAL ENCOUNTER (OUTPATIENT)
Dept: CT IMAGING | Facility: HOSPITAL | Age: 83
Discharge: HOME | End: 2018-05-03
Admitting: NURSE PRACTITIONER

## 2018-05-03 VITALS
DIASTOLIC BLOOD PRESSURE: 68 MMHG | SYSTOLIC BLOOD PRESSURE: 154 MMHG | OXYGEN SATURATION: 97 % | HEART RATE: 86 BPM | TEMPERATURE: 97.8 F | BODY MASS INDEX: 36.22 KG/M2 | WEIGHT: 211 LBS | RESPIRATION RATE: 18 BRPM

## 2018-05-03 DIAGNOSIS — C54.1 ENDOMETRIAL CANCER (HCC): Primary | ICD-10-CM

## 2018-05-03 DIAGNOSIS — C54.1 ENDOMETRIAL CANCER (HCC): ICD-10-CM

## 2018-05-03 DIAGNOSIS — R11.0 NAUSEA: ICD-10-CM

## 2018-05-03 DIAGNOSIS — R93.89 ABNORMAL FINDING OF DIAGNOSTIC IMAGING: ICD-10-CM

## 2018-05-03 DIAGNOSIS — R53.81 PHYSICAL DECONDITIONING: ICD-10-CM

## 2018-05-03 DIAGNOSIS — F41.9 ANXIETY: ICD-10-CM

## 2018-05-03 DIAGNOSIS — C54.1 RECURRENT CARCINOMA OF ENDOMETRIUM (HCC): ICD-10-CM

## 2018-05-03 DIAGNOSIS — R97.1 ELEVATED CA-125: ICD-10-CM

## 2018-05-03 DIAGNOSIS — R53.83 FATIGUE, UNSPECIFIED TYPE: ICD-10-CM

## 2018-05-03 DIAGNOSIS — N93.9 VAGINAL BLEEDING: ICD-10-CM

## 2018-05-03 PROCEDURE — 99214 OFFICE O/P EST MOD 30 MIN: CPT | Performed by: OBSTETRICS & GYNECOLOGY

## 2018-05-03 PROCEDURE — 82565 ASSAY OF CREATININE: CPT

## 2018-05-03 PROCEDURE — 63710000001 BARIUM 2 % SUSPENSION: Performed by: NURSE PRACTITIONER

## 2018-05-03 PROCEDURE — A9270 NON-COVERED ITEM OR SERVICE: HCPCS | Performed by: NURSE PRACTITIONER

## 2018-05-03 PROCEDURE — 25010000002 IOPAMIDOL 61 % SOLUTION: Performed by: NURSE PRACTITIONER

## 2018-05-03 PROCEDURE — 74177 CT ABD & PELVIS W/CONTRAST: CPT

## 2018-05-03 RX ADMIN — BARIUM SULFATE 450 ML: 21 SUSPENSION ORAL at 10:00

## 2018-05-03 RX ADMIN — IOPAMIDOL 50 ML: 612 INJECTION, SOLUTION INTRAVENOUS at 11:32

## 2018-05-04 PROBLEM — R97.1 ELEVATED CA-125: Status: ACTIVE | Noted: 2018-05-04

## 2018-05-04 PROBLEM — N93.9 VAGINAL BLEEDING: Status: ACTIVE | Noted: 2018-05-04

## 2018-05-04 PROBLEM — R11.0 NAUSEA: Status: ACTIVE | Noted: 2018-05-04

## 2018-05-04 PROBLEM — C54.1 RECURRENT CARCINOMA OF ENDOMETRIUM (HCC): Status: ACTIVE | Noted: 2018-05-04

## 2018-05-04 PROBLEM — R53.81 PHYSICAL DECONDITIONING: Status: ACTIVE | Noted: 2018-05-04

## 2018-05-04 PROBLEM — R93.89 ABNORMAL FINDING OF DIAGNOSTIC IMAGING: Status: ACTIVE | Noted: 2018-05-04

## 2018-05-04 NOTE — PROGRESS NOTES
Varsha Linares  [id number]  1935      Reason for visit:  History of serous endometrial cancer, incomplete adjuvant treatment due to poor performance status and intolerance of chemotherapy, nausea, vaginal bleeding, increasing CA-125, discussion of abnormal CT scan.    History of present illness:  The patient is a 82 y.o. year old female who presents today for treatment and evaluation of the above issues.    Today, patient notes a 2 week history of vaginal bleeding and nausea.  She is taking antiemetics 2-4 times a day as needed.  She's had increasing CA-125.  She comes today to discuss CT scan reports.  She has occasional cramping in her upper abdomen, but denies any other abdominal symptoms.  She denies other changes in her bowel or bladder function.  She notes that she does her own housework but has to take breaks through the day.  For example, she is running the vacuum she has to take multiple breaks and it takes several day to run the vacuum in her house.  She ambulates with a cane.  She is accompanied by her son.  She notes normal appetite.  The vaginal bleeding has been mainly spotting.  However, she spent her full that she could develop vaginal hemorrhage at any moment.  This is great source of anxiety for her.    Oncologic History:     Endometrial cancer    6/2017 Initial Diagnosis     Patient presented to Dr. Manning with acute severe PMB. Pap smear revealed presence of neoplastic cells. EMB showed endometrioid adenocarcinoma with papillary features and foci of high grade histology. Referred to Gyn Oncology         6/29/2017 Imaging     Pre-op CT showed 8 mm lymph node anterior to left common iliac vein or proximal to left external iliac of concern. Pre-op CA-125 = 29         6/30/2017 Surgery     RTLH/BSO and bilateral bilateral pelvic lymph node dissection with Dr. Ngo. Surgery challenging due to diverticular disease, adhesions, and adiposity.     Final pathology showed 3.5 cm high grade  papillary serous tumor involving the cervix, endocervix, and extending into the left parametrium. There was full thickness myometrial invasion and + lymphvascular space invasion. All lymph nodes and pelvic washings negative.            7/26/2017 - 8/15/2017 Chemotherapy     Adjuvant sandwich treatment offered, but patient opted for chemotherapy only x 3 planned cycles.   Carbo with weekly paclitaxel x 1 full cycle (3 weekly treatments) completed, then patient opted to discontinue due to weakness.          11/29/2017 Imaging     CT for abdominal pain negative for disease. Physiologic free fluid noted, diverticulosis with mild actue sigmoid diverticulitis noted. Pt referred to GI for management.              Past Medical History:   Diagnosis Date   • Anxiety    • Arthritis    • Back pain    • BMI 37.0-37.9, adult    • Dyslipidemia    • Endometrial cancer    • GERD (gastroesophageal reflux disease)    • Herniated disc, cervical     and lower back    • HTN (hypertension)    • Hypothyroidism    • Spinal stenosis    • Spinal stenosis     lumbar    • Wears glasses    • Wears partial dentures     upper       Past Surgical History:   Procedure Laterality Date   • APPENDECTOMY OPEN     • BACK SURGERY      x2 lumbar      • BREAST BIOPSY Right     x2, benign disease   • CATARACT EXTRACTION      bilat with lens   2008?   • CHOLECYSTECTOMY OPEN  1987   • COLONOSCOPY     • RIGHT OOPHORECTOMY  1969    Pfannenstiel.  Ovarian cysts.   • TONSILLECTOMY      unsure of adnoids    • TOTAL KNEE ARTHROPLASTY Left 2007   • TOTAL LAPAROSCOPIC HYSTERECTOMY N/A 6/30/2017    Staging for enometrial cancer   • WISDOM TOOTH EXTRACTION         MEDICATIONS: The current medication list was reviewed with the patient and updated in the EMR this date per the Medical Assistant. Medication dosages and frequencies were confirmed to be accurate.      Allergies:  has No Known Allergies.    Social History:   Social History     Social History   • Marital  status:      Spouse name: N/A   • Number of children: N/A   • Years of education: N/A     Occupational History   • Not on file.     Social History Main Topics   • Smoking status: Never Smoker   • Smokeless tobacco: Never Used   • Alcohol use No   • Drug use: No   • Sexual activity: Defer     Other Topics Concern   • Not on file     Social History Narrative   • No narrative on file       Family History:    Family History   Problem Relation Age of Onset   • Family history unknown: Yes       Health Maintenance:    Health Maintenance   Topic Date Due   • TDAP/TD VACCINES (1 - Tdap) 10/21/1956   • PNEUMOCOCCAL VACCINES (65+ LOW/MEDIUM RISK) (1 of 2 - PCV13) 10/21/2002   • MEDICARE ANNUAL WELLNESS  06/23/2017   • ZOSTER VACCINE  06/23/2017   • INFLUENZA VACCINE  08/01/2018         Review of Systems   Constitutional: Positive for fatigue. Negative for appetite change, chills, fever and unexpected weight change.   HENT: Positive for hearing loss. Negative for congestion, ear pain, sinus pressure, sore throat and trouble swallowing.    Eyes: Negative for redness and visual disturbance.   Respiratory: Positive for shortness of breath (with activity). Negative for cough, chest tightness and wheezing.    Cardiovascular: Positive for leg swelling. Negative for chest pain and palpitations.   Gastrointestinal: Positive for nausea. Negative for abdominal distention, abdominal pain, blood in stool, constipation, diarrhea and vomiting.   Endocrine: Negative.  Negative for cold intolerance, heat intolerance, polydipsia, polyphagia and polyuria.   Genitourinary: Positive for vaginal bleeding. Negative for difficulty urinating, dyspareunia, dysuria, frequency, genital sores, hematuria, pelvic pain, urgency, vaginal discharge and vaginal pain.   Musculoskeletal: Positive for gait problem. Negative for arthralgias, back pain, joint swelling and myalgias.   Skin: Negative for color change, pallor, rash and wound.    Allergic/Immunologic: Negative for food allergies and immunocompromised state.   Neurological: Negative for dizziness, seizures, syncope, weakness, light-headedness, numbness and headaches.   Hematological: Negative for adenopathy. Does not bruise/bleed easily.   Psychiatric/Behavioral: Negative for agitation, confusion, dysphoric mood, sleep disturbance and suicidal ideas. The patient is nervous/anxious.        Physical Exam    Vitals:    05/03/18 1438   BP: 154/68   Pulse: 86   Resp: 18   Temp: 97.8 °F (36.6 °C)   TempSrc: Temporal Artery    SpO2: 97%   Weight: 95.7 kg (211 lb)     Body mass index is 36.22 kg/m².    Wt Readings from Last 3 Encounters:   05/03/18 95.7 kg (211 lb)   03/01/18 95.3 kg (210 lb)   12/18/17 97.3 kg (214 lb 9.6 oz)         GENERAL: Alert, well-appearing female appearing her stated age who is in no apparent distress. Patient is obese.  HEENT: Sclera anicteric. Head normocephalic, atraumatic. Mucus membranes moist.   NECK: Trachea midline, supple, without masses.  No thyromegaly.   BREASTS: Deferred  CARDIOVASCULAR: Normal rate, regular rhythm, no murmurs, rubs, or gallops.  trace peripheral edema.  RESPIRATORY: Clear to auscultation bilaterally, normal respiratory effort  BACK:  No CVA tenderness, no vertebral tenderness on palpation  GASTROINTESTINAL:  Abdomen is soft, non-tender, non-distended, no rebound or guarding, no masses, or hernias. No HSM.  Abdomen is obese.  SKIN:  Warm, dry, well-perfused.  All visible areas intact.  No rashes, lesions, ulcers.  PSYCHIATRIC: AO x3, with appropriate affect, normal thought processes.  NEUROLOGIC: No focal deficits.  Ambulates with cane  MUSCULOSKELETAL: Ambulates with cane  EXTREMITIES:   No cyanosis, clubbing, symmetric.  LYMPHATICS:  No cervical or inguinal adenopathy noted.     PELVIC exam:    GYNECOLOGIC:  External genitalia are free from lesion. On speculum examination, the vaginal cuff was remarkable for 3-4 cm beefy red lesion  consistent with cancer recurrence.  No active bleeding was identified.  No biopsy was obtained.  On bimanual examination, no fullness was appreciated.  Uterus, cervix and adnexa were absent.  There was no significant tenderness.  Rectovaginal exam was deferred.    ECOG PS 2    PROCEDURES:  none    Diagnostic Data:      EXAMINATION: CT ABDOMEN AND PELVIS W CONTRAST-      INDICATION: Neoplasm: pelvis, primary, recurrence, suspected/known.      TECHNIQUE: Axial 5 mm post oral and IV contrast portal venous phase and  delayed venous phase images through the abdomen and pelvis.     The radiation dose reduction device was turned on for each scan per the  ALARA (As Low as Reasonably Achievable) protocol.     COMPARISON: 11/29/2017 abdomen and pelvis CT scan.     FINDINGS: Patient history indicates history of endometrial cancer,  persistent elevated CA-125, evaluate for recurrence, evaluate for  diverticulitis. Previous 11/29/2017 exam report indicates colonic  diverticulosis and perhaps mild acute sigmoid diverticulitis.     Today's exam shows the included lower lungs to appear clear.  There is  new and fairly extensive nodular disease of the omentum, the largest  nodules in the right upper quadrant, up to 14 mm in diameter. There is  fatty liver change but no evidence of intrinsic liver lesion. Spleen is  normal in size, but there is a new 2 cm implant on the medial margin of  the splenic capsule. No significant abnormalities are seen of the  pancreas, adrenal glands, or kidneys. There are mildly enlarged new  periaortic lymph nodes on the left, up to 18 mm in diameter. No ascites  is seen. Bowel loops are normal in caliber.     Regarding the lower abdomen and pelvis, there is mild diffuse thickening  in the proximal sigmoid, a little greater than on previous exams, and  trace associated fat stranding which appears new, possibly low-level  changes of proximal sigmoid diverticulitis. No discrete phlegmon,  abscess or  extraluminal air is seen. There is also a suggestion of a  couple of possibly implanted nodules or adjacent nodules along the  lateral margin of the proximal sigmoid, at the level of the left iliac  fossa. These are not identified previously, and could represent  metastatic implants, possibly with secondary inflammation of the colon.  There appear to be several similar-sized left internal iliac nodes, and  a few mildly enlarged right internal iliac nodes. Bladder is completely  decompressed. Bowel loops appear grossly normal elsewhere.     IMPRESSION:  1. Evidence of recurrent malignancy now with fairly extensive nodularity  of the omentum, mild left periaortic lymphadenopathy,, bilateral  internal iliac adenopathy and what appear to be small implants along the  proximal sigmoid colon. Single new 2 cm implant noted on the splenic  capsule.  2. Mild diffuse thickening of the proximal sigmoid, and minimal  pericolic fat stranding.  Findings could represent a low-level  diverticulitis, may be reactive due to adjacent implants, or both could  be present. No extensive inflammatory change.     D:  05/03/2018  E:  05/03/2018       Lab Results   Component Value Date    WBC 7.20 08/17/2017    HGB 11.3 (L) 08/17/2017    HCT 34.9 08/17/2017    MCV 91.6 08/17/2017     08/17/2017    NEUTROABS 3.40 08/17/2017    GLUCOSE 91 08/17/2017    BUN 10 08/17/2017    CREATININE 1.20 11/29/2017    EGFRIFNONA 48 (L) 08/17/2017     08/17/2017    K 4.2 08/17/2017     08/17/2017    CO2 25.0 08/17/2017    MG 2.1 08/17/2017    CALCIUM 9.2 08/17/2017    ALBUMIN 3.90 08/09/2017    AST 95 (H) 08/09/2017     (H) 08/09/2017    BILITOT 0.7 08/09/2017     Lab Results   Component Value Date     49.6 (H) 04/10/2018     47.5 (H) 03/01/2018     29.0 06/29/2017         Assessment/Plan   This is a 82 y.o. woman with recurrent endometrial cancer, vaginal bleeding, nausea, prior intolerance of chemotherapy.    I reviewed  the CT images with the patient and her son.  There multiple nodules consistent with omental nodules.  There is adenopathy at the periaortic area.  There was no ascites.  There is no hydronephrosis.  There multiple other implant as noted in the report.    I discussed with the patient and her son that this is a terminal recurrence of her endometrial cancer.  We discussed options including observation, vaginal brachytherapy for palliation of bleeding, chemotherapy for palliation of nausea and hopefully increased  Survival, or combination of radiation plus chemotherapy.    Patient is very hesitant to pursue any treatment at all.  After a lengthy discussion with patient and son, she would like to see radiation oncology for consideration of palliative radiation for vaginal bleeding.  She is a full that she could develop vaginal hemorrhage as this was her initial presenting symptom at the time of her cancer diagnosis.  She does not want to pursue chemotherapy.  For my review, she underwent weekly chemotherapy ×3 treatments and did not tolerate this.  I have concerns she would not feel to tolerate chemotherapy at this point any better.  She is in a bed or chair through much of the day, but states that she's out of bed or chair greater than 50% of the day.  She does get up and running her vacuum has to take breaks frequently.  She does her own laundry.    We discussed expectations regarding length of life, symptoms, and need for hospice at some point.  We discussed that I would be happy to manage her cancer symptoms until she gets sick enough that I'm not the best person for that job anymore.  We also discussed primary care provider having a role in her cancer palliation.    FOLLOW UP: Return for on an as-needed basis.    This was a 25 minute long visit with 18 spent in face-to-face consultation regarding the above documented medical problems.    Note: Speech recognition transcription software was used to dictate portions of  this document.  An attempt at proofreading has been made though minor errors in transcription may still be present.  Please do not hesitate to call our office with any questions.      Electronically Signed by: Lenora Fink MD  Date: 5/4/2018

## 2018-05-07 LAB — CREAT BLDA-MCNC: 1.3 MG/DL (ref 0.6–1.3)

## 2018-05-30 ENCOUNTER — HOSPITAL ENCOUNTER (OUTPATIENT)
Dept: RADIATION ONCOLOGY | Facility: HOSPITAL | Age: 83
Setting detail: RADIATION/ONCOLOGY SERIES
Discharge: HOME | End: 2018-05-30

## 2018-06-07 ENCOUNTER — OFFICE VISIT (OUTPATIENT)
Dept: GYNECOLOGIC ONCOLOGY | Facility: CLINIC | Age: 83
End: 2018-06-07

## 2018-06-07 ENCOUNTER — TELEPHONE (OUTPATIENT)
Dept: GYNECOLOGIC ONCOLOGY | Facility: CLINIC | Age: 83
End: 2018-06-07

## 2018-06-07 VITALS
TEMPERATURE: 97.6 F | DIASTOLIC BLOOD PRESSURE: 56 MMHG | WEIGHT: 208 LBS | HEART RATE: 68 BPM | OXYGEN SATURATION: 95 % | BODY MASS INDEX: 35.7 KG/M2 | RESPIRATION RATE: 20 BRPM | SYSTOLIC BLOOD PRESSURE: 115 MMHG

## 2018-06-07 DIAGNOSIS — R10.32 LEFT INGUINAL PAIN: ICD-10-CM

## 2018-06-07 DIAGNOSIS — Z71.89 COUNSELING REGARDING END OF LIFE DECISION MAKING: ICD-10-CM

## 2018-06-07 DIAGNOSIS — R10.84 GENERALIZED ABDOMINAL PAIN: ICD-10-CM

## 2018-06-07 DIAGNOSIS — G89.3 CANCER ASSOCIATED PAIN: ICD-10-CM

## 2018-06-07 DIAGNOSIS — R11.0 NAUSEA: ICD-10-CM

## 2018-06-07 DIAGNOSIS — R11.2 NON-INTRACTABLE VOMITING WITH NAUSEA, UNSPECIFIED VOMITING TYPE: ICD-10-CM

## 2018-06-07 DIAGNOSIS — C54.1 RECURRENT CARCINOMA OF ENDOMETRIUM (HCC): ICD-10-CM

## 2018-06-07 DIAGNOSIS — C54.1 ENDOMETRIAL CANCER (HCC): Primary | ICD-10-CM

## 2018-06-07 PROCEDURE — 99213 OFFICE O/P EST LOW 20 MIN: CPT | Performed by: OBSTETRICS & GYNECOLOGY

## 2018-06-07 RX ORDER — IBUPROFEN 600 MG/1
600 TABLET ORAL EVERY 6 HOURS PRN
Qty: 60 TABLET | Refills: 3 | Status: SHIPPED | OUTPATIENT
Start: 2018-06-07

## 2018-06-07 RX ORDER — ACETAMINOPHEN 325 MG/1
650 TABLET ORAL EVERY 6 HOURS PRN
Qty: 60 TABLET | Refills: 6 | Status: SHIPPED | OUTPATIENT
Start: 2018-06-07

## 2018-06-07 RX ORDER — OXYCODONE HYDROCHLORIDE 5 MG/1
5 TABLET ORAL EVERY 4 HOURS PRN
Qty: 60 TABLET | Refills: 0 | Status: SHIPPED | OUTPATIENT
Start: 2018-06-07

## 2018-06-07 RX ORDER — PROCHLORPERAZINE MALEATE 10 MG
10 TABLET ORAL EVERY 6 HOURS PRN
Qty: 30 TABLET | Refills: 5 | Status: SHIPPED | OUTPATIENT
Start: 2018-06-07

## 2018-06-07 RX ORDER — ONDANSETRON 4 MG/1
4 TABLET, FILM COATED ORAL EVERY 6 HOURS PRN
Qty: 30 TABLET | Refills: 0 | Status: SHIPPED | OUTPATIENT
Start: 2018-06-07

## 2018-06-07 NOTE — PROGRESS NOTES
Varsha MOODY Jessie  [id number]  1935      Reason for visit:  History of serous endometrial cancer, incomplete adjuvant treatment due to poor performance status and intolerance of chemotherapy, nausea, vaginal bleeding, increasing CA-125, symptom management.    History of present illness:  The patient is a 82 y.o. year old female who presents today for treatment and evaluation of the above issues.    Patient reports since her last visit she has had significant nausea which has impacted her ability to tolerate PO. She takes zofran and compazine at home intermittently with some improvement in symptoms but is not taking it as often as she can. We discussed taking these medications regularly to help manage these symptoms. She also notes increased abdominal pain for which she is taking oxycodone 1-2 times daily. She does not want to increase her narcotic intake because she does not like the way it makes her feel. We also discussed the importance of managing her GERD symptoms with her zantac to minimize her nausea. We discussed taking ibuprofen and acetaminophen regularly throughout the day in order to help with any pain when she does not want to take the oxycodone.    We discussed the idea of a referral to hospice today in order to better introduce herself to what they have to offer. She understands that she can initiate hospice care at any time and that this referral does not necessarily mean she needs initiate care with them now, however establishing a relationship with them now may make initiation of care easier later on. She reports overall she has been able to participate in activities she enjoys currently and I encouraged her to continue doing so while symptoms remain minimal.     Oncologic History:     Endometrial cancer    6/2017 Initial Diagnosis     Patient presented to Dr. Manning with acute severe PMB. Pap smear revealed presence of neoplastic cells. EMB showed endometrioid adenocarcinoma with papillary  features and foci of high grade histology. Referred to Gyn Oncology         6/29/2017 Imaging     Pre-op CT showed 8 mm lymph node anterior to left common iliac vein or proximal to left external iliac of concern. Pre-op CA-125 = 29         6/30/2017 Surgery     RTLH/BSO and bilateral bilateral pelvic lymph node dissection with Dr. Ngo. Surgery challenging due to diverticular disease, adhesions, and adiposity.     Final pathology showed 3.5 cm high grade papillary serous tumor involving the cervix, endocervix, and extending into the left parametrium. There was full thickness myometrial invasion and + lymphvascular space invasion. All lymph nodes and pelvic washings negative.            7/26/2017 - 8/15/2017 Chemotherapy     Adjuvant sandwich treatment offered, but patient opted for chemotherapy only x 3 planned cycles.   Carbo with weekly paclitaxel x 1 full cycle (3 weekly treatments) completed, then patient opted to discontinue due to weakness.          11/29/2017 Imaging     CT for abdominal pain negative for disease. Physiologic free fluid noted, diverticulosis with mild actue sigmoid diverticulitis noted. Pt referred to GI for management.         5/3/2018 Imaging     CT A/P IMPRESSION:  1. Evidence of recurrent malignancy now with fairly extensive nodularity  of the omentum, mild left periaortic lymphadenopathy,, bilateral  internal iliac adenopathy and what appear to be small implants along the  proximal sigmoid colon. Single new 2 cm implant noted on the splenic  capsule.  2. Mild diffuse thickening of the proximal sigmoid, and minimal  pericolic fat stranding.  Findings could represent a low-level  diverticulitis, may be reactive due to adjacent implants, or both could  be present. No extensive inflammatory change.              Past Medical History:   Diagnosis Date   • Anxiety    • Arthritis    • Back pain    • BMI 37.0-37.9, adult    • Dyslipidemia    • Endometrial cancer    • GERD (gastroesophageal  reflux disease)    • Herniated disc, cervical     and lower back    • HTN (hypertension)    • Hypothyroidism    • Spinal stenosis    • Spinal stenosis     lumbar    • Wears glasses    • Wears partial dentures     upper       Past Surgical History:   Procedure Laterality Date   • APPENDECTOMY OPEN     • BACK SURGERY      x2 lumbar      • BREAST BIOPSY Right     x2, benign disease   • CATARACT EXTRACTION      bilat with lens   2008?   • CHOLECYSTECTOMY OPEN  1987   • COLONOSCOPY     • RIGHT OOPHORECTOMY  1969    Pfannenstiel.  Ovarian cysts.   • TONSILLECTOMY      unsure of adnoids    • TOTAL KNEE ARTHROPLASTY Left 2007   • TOTAL LAPAROSCOPIC HYSTERECTOMY N/A 6/30/2017    Staging for enometrial cancer   • WISDOM TOOTH EXTRACTION         MEDICATIONS: The current medication list was reviewed with the patient and updated in the EMR this date per the Medical Assistant. Medication dosages and frequencies were confirmed to be accurate.      Allergies:  has No Known Allergies.    Social History:   Social History     Social History   • Marital status:      Spouse name: N/A   • Number of children: N/A   • Years of education: N/A     Occupational History   • Not on file.     Social History Main Topics   • Smoking status: Never Smoker   • Smokeless tobacco: Never Used   • Alcohol use No   • Drug use: No   • Sexual activity: Defer     Other Topics Concern   • Not on file     Social History Narrative   • No narrative on file       Family History:    Family History   Problem Relation Age of Onset   • Family history unknown: Yes       Health Maintenance:    Health Maintenance   Topic Date Due   • TDAP/TD VACCINES (1 - Tdap) 10/[birth year]/1956   • ZOSTER VACCINE (1 of 2) 10/21/1987   • PNEUMOCOCCAL VACCINES (65+ LOW/MEDIUM RISK) (1 of 2 - PCV13) 10/21/2002   • MEDICARE ANNUAL WELLNESS  06/23/2017   • INFLUENZA VACCINE  08/01/2018         Review of Systems   Constitutional: Positive for fatigue. Negative for appetite change, chills,  fever and unexpected weight change.   HENT: Positive for hearing loss. Negative for congestion, ear pain, sinus pressure, sore throat and trouble swallowing.    Eyes: Negative for redness and visual disturbance.   Respiratory: Positive for shortness of breath (with activity). Negative for cough, chest tightness and wheezing.    Cardiovascular: Positive for leg swelling. Negative for chest pain and palpitations.   Gastrointestinal: Positive for nausea. Negative for abdominal distention, abdominal pain, blood in stool, constipation, diarrhea and vomiting.   Endocrine: Negative.  Negative for cold intolerance, heat intolerance, polydipsia, polyphagia and polyuria.   Genitourinary: Negative for difficulty urinating, dyspareunia, dysuria, frequency, genital sores, hematuria, pelvic pain, urgency, vaginal bleeding, vaginal discharge and vaginal pain.   Musculoskeletal: Positive for gait problem. Negative for arthralgias, back pain, joint swelling and myalgias.   Skin: Negative for color change, pallor, rash and wound.   Allergic/Immunologic: Negative for food allergies and immunocompromised state.   Neurological: Negative for dizziness, seizures, syncope, weakness, light-headedness, numbness and headaches.   Hematological: Negative for adenopathy. Does not bruise/bleed easily.   Psychiatric/Behavioral: Negative for agitation, confusion, dysphoric mood, sleep disturbance and suicidal ideas. The patient is nervous/anxious.        Physical Exam    Vitals:    06/07/18 1028   BP: 115/56   Pulse: 68   Resp: 20   Temp: 97.6 °F (36.4 °C)   TempSrc: Temporal Artery    SpO2: 95%   Weight: 94.3 kg (208 lb)     Body mass index is 35.7 kg/m².    Wt Readings from Last 3 Encounters:   06/07/18 94.3 kg (208 lb)   05/03/18 95.7 kg (211 lb)   03/01/18 95.3 kg (210 lb)         GENERAL: Alert, well-appearing female appearing her stated age who is in no apparent distress. Patient is obese.  HEENT: Sclera anicteric. Head normocephalic,  atraumatic. Mucus membranes moist.   NECK: Trachea midline, supple, without masses.  No thyromegaly.   BREASTS: Deferred  CARDIOVASCULAR: Normal rate, regular rhythm, no murmurs, rubs, or gallops.  trace peripheral edema.  RESPIRATORY: Clear to auscultation bilaterally, normal respiratory effort  BACK:  No CVA tenderness, no vertebral tenderness on palpation  GASTROINTESTINAL:  Abdomen is soft, non-tender, non-distended, no rebound or guarding, no masses, or hernias. No HSM.  Abdomen is obese.  SKIN:  Warm, dry, well-perfused.  All visible areas intact.  No rashes, lesions, ulcers.  PSYCHIATRIC: AO x3, with appropriate affect, normal thought processes.  NEUROLOGIC: No focal deficits.  Ambulates with cane  MUSCULOSKELETAL: Ambulates with cane  EXTREMITIES:   No cyanosis, clubbing, symmetric.  LYMPHATICS:  No cervical or inguinal adenopathy noted.     PELVIC exam:  deferred    ECOG PS 2    PROCEDURES:  none    Diagnostic Data:      EXAMINATION: CT ABDOMEN AND PELVIS W CONTRAST-      INDICATION: Neoplasm: pelvis, primary, recurrence, suspected/known.      TECHNIQUE: Axial 5 mm post oral and IV contrast portal venous phase and  delayed venous phase images through the abdomen and pelvis.     The radiation dose reduction device was turned on for each scan per the  ALARA (As Low as Reasonably Achievable) protocol.     COMPARISON: 11/29/2017 abdomen and pelvis CT scan.     FINDINGS: Patient history indicates history of endometrial cancer,  persistent elevated CA-125, evaluate for recurrence, evaluate for  diverticulitis. Previous 11/29/2017 exam report indicates colonic  diverticulosis and perhaps mild acute sigmoid diverticulitis.     Today's exam shows the included lower lungs to appear clear.  There is  new and fairly extensive nodular disease of the omentum, the largest  nodules in the right upper quadrant, up to 14 mm in diameter. There is  fatty liver change but no evidence of intrinsic liver lesion. Spleen is  normal  in size, but there is a new 2 cm implant on the medial margin of  the splenic capsule. No significant abnormalities are seen of the  pancreas, adrenal glands, or kidneys. There are mildly enlarged new  periaortic lymph nodes on the left, up to 18 mm in diameter. No ascites  is seen. Bowel loops are normal in caliber.     Regarding the lower abdomen and pelvis, there is mild diffuse thickening  in the proximal sigmoid, a little greater than on previous exams, and  trace associated fat stranding which appears new, possibly low-level  changes of proximal sigmoid diverticulitis. No discrete phlegmon,  abscess or extraluminal air is seen. There is also a suggestion of a  couple of possibly implanted nodules or adjacent nodules along the  lateral margin of the proximal sigmoid, at the level of the left iliac  fossa. These are not identified previously, and could represent  metastatic implants, possibly with secondary inflammation of the colon.  There appear to be several similar-sized left internal iliac nodes, and  a few mildly enlarged right internal iliac nodes. Bladder is completely  decompressed. Bowel loops appear grossly normal elsewhere.     IMPRESSION:  1. Evidence of recurrent malignancy now with fairly extensive nodularity  of the omentum, mild left periaortic lymphadenopathy,, bilateral  internal iliac adenopathy and what appear to be small implants along the  proximal sigmoid colon. Single new 2 cm implant noted on the splenic  capsule.  2. Mild diffuse thickening of the proximal sigmoid, and minimal  pericolic fat stranding.  Findings could represent a low-level  diverticulitis, may be reactive due to adjacent implants, or both could  be present. No extensive inflammatory change.     D:  05/03/2018  E:  05/03/2018       Lab Results   Component Value Date    WBC 7.20 08/17/2017    HGB 11.3 (L) 08/17/2017    HCT 34.9 08/17/2017    MCV 91.6 08/17/2017     08/17/2017    NEUTROABS 3.40 08/17/2017     GLUCOSE 91 08/17/2017    BUN 10 08/17/2017    CREATININE 1.30 05/03/2018    EGFRIFNONA 48 (L) 08/17/2017     08/17/2017    K 4.2 08/17/2017     08/17/2017    CO2 25.0 08/17/2017    MG 2.1 08/17/2017    CALCIUM 9.2 08/17/2017    ALBUMIN 3.90 08/09/2017    AST 95 (H) 08/09/2017     (H) 08/09/2017    BILITOT 0.7 08/09/2017     Lab Results   Component Value Date     49.6 (H) 04/10/2018     47.5 (H) 03/01/2018     29.0 06/29/2017         Assessment/Plan   This is a 82 y.o. woman with recurrent endometrial cancer, vaginal bleeding, nausea, prior intolerance of chemotherapy.    We discussed improving medication intake to help with symptom management, specifically pain and nausea. The pt voiced understanding and her son agreed to help as well. A referral to her local hospice provider was made today to establish care and she understands she can initiate are with them at any time. Vaginal bleeding has resolved with palliative radiation. No further treatment therapies are available given her performance status and prior intolerance to chemotherapy.    She was RX zofran, compazine, oxycodone, tylenol and ibuprofen.  Sample schedule of meds was provided to pt/family.    FOLLOW UP: PRN    This was a 15 minute long visit with 13 minutes spent in face-to-face consultation regarding the above documented medical problems.    Note: Speech recognition transcription software was used to dictate portions of this document.  An attempt at proofreading has been made though minor errors in transcription may still be present.  Please do not hesitate to call our office with any questions.    Patient was seen and examined with Dr. Barbour,  resident, who performed portions of the examination and documentation for this patient's care under my direct supervision.    Lenora Fink MD  06/09/18  11:17 AM

## 2018-06-07 NOTE — TELEPHONE ENCOUNTER
Spoke with Becca at [facility] regarding new pt referral.  Facesheet and last office note faxed to her at [phone].

## 2018-06-08 ENCOUNTER — TELEPHONE (OUTPATIENT)
Dept: ONCOLOGY | Facility: CLINIC | Age: 83
End: 2018-06-08

## 2018-06-08 NOTE — TELEPHONE ENCOUNTER
hero has already contacted [email or web address]. They have set up an appointment to meet with patient and the family.

## 2018-06-08 NOTE — TELEPHONE ENCOUNTER
----- Message from Ashley Clarke sent at 6/8/2018  8:51 AM EDT -----  Regarding: hospice referral  Received a call from [facility] navigators that they are not the covering hospice company for [facility]. You will need to contact [facility].

## 2018-06-09 PROBLEM — Z71.89 COUNSELING REGARDING END OF LIFE DECISION MAKING: Status: ACTIVE | Noted: 2018-06-09

## 2018-06-09 PROBLEM — G89.3 CANCER ASSOCIATED PAIN: Status: ACTIVE | Noted: 2018-06-09

## 2018-06-09 PROBLEM — R10.84 GENERALIZED ABDOMINAL PAIN: Status: ACTIVE | Noted: 2018-06-09

## 2018-07-03 NOTE — TELEPHONE ENCOUNTER
GYN Oncology    Telephone Call    I spoke with the patient's son.  I support the patient's decision of her discontinuing chemotherapy if she feels that is the best decision for her.      It sounds like she is doing well with fluids, but appetite and oral intake have been poor, possibly also some fevers.  With her hyponatremia and creatinine elevated from baseline, I would like her to have labs and possibly fluids this week as an outpatient - office to arrange and place orders (CBC, BMP, Mag, possible 1 liter NS), likely on Thurs 8/17.    Romy Roland RN to coordinate and then call the patient's son on his cell phone to given him the details.  I will see her in clinic for follow up either late next week or the following week.    Pretty Ngo MD  08/15/17  11:54 AM    
Admission

## 2024-08-12 NOTE — PROGRESS NOTES
Varsha MOODY [place]  [id number]  1935    Subjective     Reason for visit:  First visit since patient elected discontinuation of therapy    History of present illness:  The patient is a 81 y.o. 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  She underwent minimally invasive staging surgery and opted for adjuvant therapy.  She received one cycle (three weekly treatments) of weekly carboplatin, weekly paclitaxel and then opted to discontinue therapy secondary to weakness.  She presents today for her first visit since discontinuing therapy.    She feels she is slowly returning to her baseline.  She did attend physical therapy and continues to do the physical therapy exercises at home.  Her energy is improving this T Cook continues to be a major complaint.  Her appetite is improving.  Nausea is still present visit is improving.  Normal bowel and bladder function.  Denies abdominal pain or bloating.  No vomiting.  No vaginal discharge or bleeding.  No fevers or chills.  No issues with her skin.  No neuropathy.    As mentioned in prior notes her daughter  from metastatic breast cancer and the patient has PTSD over this experience and it impairs her perspective on cancer treatment.      Treatment History:    1.  EMB with Dr. Manning showed showed endometrial adenocarcinoma with papillary features and foci of high-grade histology.  Comments stated overall grade 2 with foci of grade 3.    2.  Pre-operative  was 29.  Preoperative CT of the abdomen/pelvis showed an 8 mm node anterior to the left common iliac vein or the proximal left external iliac vein which is a singular lymph node and is  noteworthy for possible exploration during the surgical resection of the uterus.  3.  On 17 she underwent a robotic-assisted total laparoscopic hysterectomy, bilateral salpingo-oophorectomy, and bilateral pelvic lymph node dissection.  Her surgery was challenging given the extent of her diverticular  disease and adhesions to the left adnexa as well as her significant visceral adiposity.   4.  Final pathology returned showing high-grade papillary serous adenocarcinoma involving the cervix, endocervix, and tumor extending into the left parametrium.  Tumor size was 3.5 cm.  There was full-thickness myometrial invasion.  +LVSI.  There were 0/4 right pelvic lymph nodes and 0/4 left pelvic lymph nodes.  Washings were negative.  5.  She was counseled regarding options for adjuvant therapy including sandwich.  She has opted to proceed with chemotherapy alone.  She received one cycle (three weekly treatments) of weekly carboplatin, weekly paclitaxel and then opted to discontinue therapy secondary to weakness.  Her last treatment was on 8/9/17.    OBGYN History: NSVDx2.  No history of abnormal paps.  Prior RSO via Pfannenstiel for ovarian cysts.    Past Medical History:   Diagnosis Date   • Anxiety    • Arthritis    • Back pain    • BMI 37.0-37.9, adult    • Dyslipidemia    • Endometrial cancer    • GERD (gastroesophageal reflux disease)    • Herniated disc, cervical     and lower back    • HTN (hypertension)    • Hypothyroidism    • Spinal stenosis    • Spinal stenosis     lumbar    • Wears glasses    • Wears partial dentures     upper       Past Surgical History:   Procedure Laterality Date   • APPENDECTOMY OPEN     • BACK SURGERY      x2 lumbar      • BREAST BIOPSY Right     x2, benign disease   • CATARACT EXTRACTION      bilat with lens   2008?   • CHOLECYSTECTOMY OPEN  1987   • COLONOSCOPY     • RIGHT OOPHORECTOMY  1969    Pfannenstiel.  Ovarian cysts.   • TONSILLECTOMY      unsure of adnoids    • TOTAL KNEE ARTHROPLASTY Left 2007   • TOTAL LAPAROSCOPIC HYSTERECTOMY N/A 6/30/2017    Staging for enometrial cancer   • WISDOM TOOTH EXTRACTION         MEDICATIONS: The current medication list was reviewed with the patient and updated in the EMR this date per the Medical Assistant. Medication dosages and frequencies were  confirmed to be accurate.      Allergies:  has No Known Allergies.    Social History: She is .  She lives 45 min away in [place].  She lives by herself and does not work.  She denies use of tobacco, alcohol, and illicit drugs.     Family History:  Denies a history of breast, colon, endometrial, ovarian, and prostate cancer.  No history of melanoma.    Health Maintenance:    1. Mammogram - 12/2015  2. Colonoscopy - 2002    Review of Systems:  CONSTITUTIONAL:  Weight has been stable.  +fatigue.  No fever.  No chills, night sweats.  PSYCHIATRIC: +anxiety.  No history of depression.  No bipolar disorder or insomnia.  EYES: Vision is unchanged.  No photophobia.  EARS, NOSE, MOUTH, AND THROAT: Hearing normal. No swallowing difficulties.  No sore throat.  ENDOCRINE: No history of diabetes.  +thyroid disease.  LYMPHATIC: No enlarged lymph nodes  RESPIRATORY: No shortness of breath, cough, asthma or wheezing.  No hemoptysis.  CARDIOVASCULAR: No angina, orthopnea.  No edema.  +HTN.  +hyperlipidemia.  GASTROINTESTINAL: No constipation.  No diarrhea.  No melena.  +reflux.  +nausea.  No vomiting.  GENITOURINARY: No dysuria, hematuria, urgency, or frequency.  NEUROLOGIC: +less weakness.  No numbness, motor disturbance, syncope, seizure, or headaches.  MUSCULOSKELETAL: +joint pain.  No muscle weakness.  INTEGUMENTARY: No new skin lesions.  GYNECOLOGIC: Per history of present illness.  HEMATOLOGIC: No bleeding problems.  Denies easy bruising.    Objective      Physical Exam  Vitals:    09/06/17 1001   BP: 146/78   Pulse: 74   Resp: 16   Temp: 96.8 °F (36 °C)   SpO2: 97%     Body mass index is 35.87 kg/(m^2).    Wt Readings from Last 3 Encounters:   09/06/17 209 lb (94.8 kg)   08/17/17 212 lb (96.2 kg)   08/09/17 211 lb (95.7 kg)     ECOG PS 0    GENERAL: Alert, well-appearing elderly female in no apparent distress.  She appears her stated age.  She is here with her son.  HEENT: Sclera anicteric, normal eyelids. Head  normocephalic, atraumatic. Mucus membranes moist.  Normal dentition.    NECK: Trachea midline, supple, without masses.  No thyromegaly.      CARDIOVASCULAR: Normal rate, regular rhythm, no murmurs, rubs, or gallops.  Extremities symmetric.  No peripheral edema.  RESPIRATORY: Clear to auscultation bilaterally, normal respiratory effort  GASTROINTESTINAL:  Soft, obese, non-tender, non-distended, no rebound or guarding, no masses, or hernias.  No HSM.  Incisions - healed LSC incisions.  MUSCULOSKELETAL:  Normal gait and station.  FROMx4.  No digital cyanosis.    SKIN:  Warm, dry, well-perfused.  All visible areas intact.  No rashes, lesions, ulcers.  PSYCHIATRIC: AO x3, with appropriate affect, normal thought processes  LYMPHATICS:  No cervical or inguinal adenopathy noted.  PELVIC: Normal external female genitalia without lesions or skin changes.  Urethra midline.  Vagina without lesions.  Cuff intact, healed, and without visible lesions.  On bimanual exam vagina and cuff are smooth and without nodularity.  No palpable abdominal masses. Rectovaginal exam confirmatory.      Diagnostic Data:    Post-op CT Chest 7/18/17  FINDINGS: The patient history indicates stage III uterine cancer,  evaluation for metastatic disease. No given history of active chest  symptoms.  There is good contrast opacification of the pulmonary arteries on this  exam sufficient to exclude any large pulmonary emboli. No mediastinal  adenopathy, pericardial or pleural effusion is seen. There is no  evidence of axillary adenopathy.  Lung window images show trace linear atelectasis in the right posterior  costophrenic recess, but no evidence of active pulmonary parenchymal  disease. A few minute granulomatous calcifications are noted. The bony  structures appear intact.  Included images of the upper abdomen show no significant abnormalities  of the visualized portions of the liver, spleen, pancreatic tail,  adrenal glands, or upper renal poles. The  gallbladder is surgically  absent.  IMPRESSION:  No evidence of metastatic disease or other active disease in  the chest.    Lab Results   Component Value Date    WBC 7.20 08/17/2017    HGB 11.3 (L) 08/17/2017    HCT 34.9 08/17/2017    MCV 91.6 08/17/2017     08/17/2017    NEUTROABS 3.40 08/17/2017    GLUCOSE 91 08/17/2017    BUN 10 08/17/2017    CREATININE 1.10 08/17/2017     08/17/2017    K 4.2 08/17/2017     08/17/2017    CO2 25.0 08/17/2017    MG 2.1 08/17/2017    CALCIUM 9.2 08/17/2017    ALBUMIN 3.90 08/09/2017    AST 95 (H) 08/09/2017     (H) 08/09/2017    BILITOT 0.7 08/09/2017     29.0 06/29/2017         Assessment/Plan  This is a 81 y.o. 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  She received one cycle of carboplatin/paclitaxel and then opted to discontinue therapy secondary to weakness.      1.  Endometrial cancer:    -I am supportive of the patient's decision to Discontinue therapy.  She felt it was too hard on her physically and emotionally.  -Enter surveillance.  She understands the typical recommendation for high risk endometrial cancer surveillance (stage III/IV, serous or clear cell) are visits every 3 months for the first two years, every 6 months up until 5 years, and annually thereafter.  We discussed need to monitor symptoms including any vaginal bleeding or discharge, abdominal pain, bloating, changes in bowel or bladder habits.  She knows to be particularly aware of symptoms that occur several times a week for at least several weeks.  She is to call immediately with any vaginal bleeding or abnormal discharge.    -We also discussed the role of physical exam, CA-125, and imaging.  At this time I plan to defer any scheduled routine interval imaging and labs as she is likely to be unwilling to accept any further treatment even if scanning did show something.  If she develops symptoms, or there is something to palliate, would advise imaging at  How Many Mls Were Removed From The 80 Mg/Ml (5ml) Vial When Preparing The Injectable Solution?: 0 that time.    2.  Deconditioning/resolving treatment toxicities  -Continue home PT  -Continue emotional support for her anxiety and PTSD history from her daughter's death from breast cancer    3.  Routine health maintenance  -She will schedule a mammogram    4.  Postoperative:  -The primary reason for this visit was unrelated to the postoperative care of this patient.    -Pelvic exam was performed today and she has healed well    5.  Follow up:   -RTC in 3 months for a surveillance visit with APRN        Electronically Signed by: Pretty Ngo MD  Date: 9/6/2017      Time:  1:57 PM    Note: Speech recognition transcription software was used to dictate portions of this document.  An attempt at proofreading has been made though minor errors in transcription may still be present.  Please do not hesitate to call our office with any questions.       Administered By (Optional): JIMBO Lot # For Kenalog (Optional): [id number] Ndc# For Kenalog Only: [id number] Detail Level: Detailed Consent: Discussed risks of allergic reaction and atrophy of skin Total Volume (Ccs): 1.1 Include Z78.9 (Other Specified Conditions Influencing Health Status) As An Associated Diagnosis?: No Validate Note Data When Using Inventory: Yes Kenalog Type Of Vial: Multiple Dose Expiration Date For Kenalog (Optional): January 2026 Concentration Of Kenalog Solution Injected (Mg/Ml): 5.0 Medical Necessity Clause: This procedure was medically necessary because the lesions that were treated were: Which Kenalog Vial Was Used?: Kenalog 10 mg/ml (5 ml vial) Kenalog Preparation: Kenalog

## (undated) DEVICE — DRSNG WND BORDR/ADHS NONADHR/GZ LF 2X2IN STRL

## (undated) DEVICE — PK MAJ GYN DAVINCI 10

## (undated) DEVICE — INTENDED USE FOR SURGICAL MARKING ON INTACT SKIN, ALSO PROVIDES A PERMANENT METHOD OF IDENTIFYING OBJECTS IN THE OPERATING ROOM: Brand: WRITESITE® REGULAR TIP SKIN MARKER

## (undated) DEVICE — Device

## (undated) DEVICE — MEDI-VAC NON-CONDUCTIVE SUCTION TUBING: Brand: CARDINAL HEALTH

## (undated) DEVICE — ADAPT ST INFUS ADMIN SYR 70IN

## (undated) DEVICE — OCCL COLPO PNEUMO  STRL

## (undated) DEVICE — AIRWY 90MM NO9

## (undated) DEVICE — ST EXT IV SMARTSITE 2VLV SP M LL 5ML IV1

## (undated) DEVICE — DUAL LUMEN STOMACH TUBE,ANTI-REFLUX VALVE: Brand: SALEM SUMP

## (undated) DEVICE — GOWN,NON-REINFORCED,SIRUS,SET IN SLV,XL: Brand: MEDLINE

## (undated) DEVICE — GLV SURG SENSICARE MICRO PF LF 6.5 STRL

## (undated) DEVICE — SUT MNCRYL PLS ANTIB UD 3/0 PS2 27IN

## (undated) DEVICE — OBT BLADLES ENDOWRIST DAVINCI/S 8MM

## (undated) DEVICE — BOWL UTIL STRL 32OZ

## (undated) DEVICE — MEDI-VAC YANKAUER SUCTION HANDLE W/BULBOUS TIP: Brand: CARDINAL HEALTH

## (undated) DEVICE — 3M™ IOBAN™ 2 ANTIMICROBIAL INCISE DRAPE 6650EZ: Brand: IOBAN™ 2

## (undated) DEVICE — PAD STEEP TRENDELENBURG W/RAIL STRAP INTEGR ARM PROTECT WING

## (undated) DEVICE — INTENDED FOR TISSUE SEPARATION, AND OTHER PROCEDURES THAT REQUIRE A SHARP SURGICAL BLADE TO PUNCTURE OR CUT.: Brand: BARD-PARKER ® STAINLESS STEEL BLADES

## (undated) DEVICE — FLTR PLUMEPORT LAP W/CONN STRL

## (undated) DEVICE — DRAPE,TOP,102X53,STERILE: Brand: MEDLINE

## (undated) DEVICE — CAUTERY TIP POLISHER: Brand: DEVON

## (undated) DEVICE — SKIN AFFIX SURG ADHESIVE 72/CS 0.55ML: Brand: MEDLINE

## (undated) DEVICE — ANTIBACTERIAL UNDYED BRAIDED (POLYGLACTIN 910), SYNTHETIC ABSORBABLE SURGICAL SUTURE: Brand: COATED VICRYL

## (undated) DEVICE — SEAL CANN CAM ENDOWRIST DAVINCI/S 8.5MM

## (undated) DEVICE — COVADERM PLUS: Brand: DEROYAL

## (undated) DEVICE — TROCARS: Brand: KII® OPTICAL ACCESS SYSTEM

## (undated) DEVICE — TIP COVER ACCESSORY

## (undated) DEVICE — TISSUE RETRIEVAL SYSTEM: Brand: INZII RETRIEVAL SYSTEM

## (undated) DEVICE — DISH PETRI 3.5IN MD STRL LF

## (undated) DEVICE — APPL CHLORAPREP W/TINT 26ML BLU

## (undated) DEVICE — CANNULA,ADULT,SOFT-TOUCH,7TUBE,SC: Brand: MEDLINE

## (undated) DEVICE — SPONGE,LAP,4"X18",XR,ST,5/PK,40PK/CS: Brand: MEDLINE INDUSTRIES, INC.

## (undated) DEVICE — [HIGH FLOW INSUFFLATOR,  DO NOT USE IF PACKAGE IS DAMAGED,  KEEP DRY,  KEEP AWAY FROM SUNLIGHT,  PROTECT FROM HEAT AND RADIOACTIVE SOURCES.]: Brand: PNEUMOSURE